# Patient Record
Sex: FEMALE | Race: WHITE | NOT HISPANIC OR LATINO | URBAN - METROPOLITAN AREA
[De-identification: names, ages, dates, MRNs, and addresses within clinical notes are randomized per-mention and may not be internally consistent; named-entity substitution may affect disease eponyms.]

---

## 2021-05-06 ENCOUNTER — OFFICE VISIT (OUTPATIENT)
Dept: OBSTETRICS AND GYNECOLOGY | Facility: CLINIC | Age: 21
End: 2021-05-06
Attending: STUDENT IN AN ORGANIZED HEALTH CARE EDUCATION/TRAINING PROGRAM
Payer: COMMERCIAL

## 2021-05-06 VITALS
BODY MASS INDEX: 23.7 KG/M2 | SYSTOLIC BLOOD PRESSURE: 112 MMHG | HEIGHT: 61 IN | WEIGHT: 125.56 LBS | DIASTOLIC BLOOD PRESSURE: 78 MMHG

## 2021-05-06 DIAGNOSIS — Z12.4 SCREENING FOR CERVICAL CANCER: Primary | ICD-10-CM

## 2021-05-06 DIAGNOSIS — Z01.419 WELL WOMAN EXAM: ICD-10-CM

## 2021-05-06 PROCEDURE — 99999 PR PBB SHADOW E&M-NEW PATIENT-LVL II: ICD-10-PCS | Mod: PBBFAC,,, | Performed by: STUDENT IN AN ORGANIZED HEALTH CARE EDUCATION/TRAINING PROGRAM

## 2021-05-06 PROCEDURE — 99385 PREV VISIT NEW AGE 18-39: CPT | Mod: S$GLB,,, | Performed by: STUDENT IN AN ORGANIZED HEALTH CARE EDUCATION/TRAINING PROGRAM

## 2021-05-06 PROCEDURE — 99999 PR PBB SHADOW E&M-NEW PATIENT-LVL II: CPT | Mod: PBBFAC,,, | Performed by: STUDENT IN AN ORGANIZED HEALTH CARE EDUCATION/TRAINING PROGRAM

## 2021-05-06 PROCEDURE — 88175 CYTOPATH C/V AUTO FLUID REDO: CPT | Performed by: STUDENT IN AN ORGANIZED HEALTH CARE EDUCATION/TRAINING PROGRAM

## 2021-05-06 PROCEDURE — 99385 PR PREVENTIVE VISIT,NEW,18-39: ICD-10-PCS | Mod: S$GLB,,, | Performed by: STUDENT IN AN ORGANIZED HEALTH CARE EDUCATION/TRAINING PROGRAM

## 2021-05-13 LAB
CLINICAL INFO: NORMAL
CYTO CVX: NORMAL
CYTOLOGIST CVX/VAG CYTO: NORMAL
CYTOLOGY CMNT CVX/VAG CYTO-IMP: NORMAL
CYTOLOGY PAP THIN PREP EXPLANATION: NORMAL
DATE OF PREVIOUS PAP: NORMAL
DATE PREVIOUS BX: NO
LMP START DATE: NORMAL
SPECIMEN SOURCE CVX/VAG CYTO: NORMAL
STAT OF ADQ CVX/VAG CYTO-IMP: NORMAL

## 2021-05-14 ENCOUNTER — TELEPHONE (OUTPATIENT)
Dept: OBSTETRICS AND GYNECOLOGY | Facility: CLINIC | Age: 21
End: 2021-05-14

## 2022-03-15 ENCOUNTER — OFFICE VISIT (OUTPATIENT)
Dept: DERMATOLOGY | Facility: CLINIC | Age: 22
End: 2022-03-15
Payer: COMMERCIAL

## 2022-03-15 DIAGNOSIS — L20.9 ATOPIC DERMATITIS, UNSPECIFIED TYPE: Primary | ICD-10-CM

## 2022-03-15 DIAGNOSIS — L70.0 ACNE VULGARIS: ICD-10-CM

## 2022-03-15 PROCEDURE — 99204 OFFICE O/P NEW MOD 45 MIN: CPT | Mod: S$GLB,,, | Performed by: STUDENT IN AN ORGANIZED HEALTH CARE EDUCATION/TRAINING PROGRAM

## 2022-03-15 PROCEDURE — 99204 PR OFFICE/OUTPT VISIT, NEW, LEVL IV, 45-59 MIN: ICD-10-PCS | Mod: S$GLB,,, | Performed by: STUDENT IN AN ORGANIZED HEALTH CARE EDUCATION/TRAINING PROGRAM

## 2022-03-15 PROCEDURE — 99999 PR PBB SHADOW E&M-EST. PATIENT-LVL II: ICD-10-PCS | Mod: PBBFAC,,, | Performed by: STUDENT IN AN ORGANIZED HEALTH CARE EDUCATION/TRAINING PROGRAM

## 2022-03-15 PROCEDURE — 99999 PR PBB SHADOW E&M-EST. PATIENT-LVL II: CPT | Mod: PBBFAC,,, | Performed by: STUDENT IN AN ORGANIZED HEALTH CARE EDUCATION/TRAINING PROGRAM

## 2022-03-15 RX ORDER — TRIFAROTENE 50 UG/G
1 CREAM TOPICAL NIGHTLY
Qty: 45 G | Refills: 5 | Status: SHIPPED | OUTPATIENT
Start: 2022-03-15 | End: 2023-11-08

## 2022-03-15 RX ORDER — CLINDAMYCIN PHOSPHATE 11.9 MG/ML
SOLUTION TOPICAL 2 TIMES DAILY
Qty: 60 ML | Refills: 5 | Status: SHIPPED | OUTPATIENT
Start: 2022-03-15 | End: 2023-11-08

## 2022-03-15 RX ORDER — PIMECROLIMUS 10 MG/G
CREAM TOPICAL
Qty: 100 G | Refills: 3 | Status: SHIPPED | OUTPATIENT
Start: 2022-03-15 | End: 2023-11-08

## 2022-03-15 RX ORDER — TRIAMCINOLONE ACETONIDE 1 MG/G
CREAM TOPICAL 2 TIMES DAILY
Qty: 80 G | Refills: 2 | Status: SHIPPED | OUTPATIENT
Start: 2022-03-15 | End: 2023-11-08

## 2022-03-15 RX ORDER — TRIFAROTENE 50 UG/G
1 CREAM TOPICAL NIGHTLY
Qty: 45 G | Refills: 5 | Status: SHIPPED | OUTPATIENT
Start: 2022-03-15 | End: 2022-03-15

## 2022-03-15 NOTE — PROGRESS NOTES
Patient Information  Name: Sallie Del Real  : 2000  MRN: [id number]     Referring Physician:  Dr. Dominguez   Primary Care Physician:   Primary Doctor No   Date of Visit: 03/15/2022      Subjective:       Sallie Del Real is a 21 y.o. female who presents for   Chief Complaint   Patient presents with    Rash     Rash on neck . Very itching and burning     Acne     Ance on face      HPI  Patient with new complaint of lesion(s)  Location: neck  Duration: years  Symptoms: itching, burning  Relieving factors/Previous treatments: topical steroids    Also with hx of eczema. Present on hands, arms. Using topical steroids.    Patient with new complaint of acne  Location: face  Duration: years  Symptoms: white/blackheads  Relieving factors/Previous treatments: clindamycin, epiduo forte,       Patient was last seen:Visit date not found     Prior notes by myself reviewed.   Clinical documentation obtained by nursing staff reviewed.    Review of Systems   Skin: Positive for itching and rash.        Objective:    Physical Exam   Constitutional: She appears well-developed and well-nourished. No distress.   Neurological: She is alert and oriented to person, place, and time. She is not disoriented.   Psychiatric: She has a normal mood and affect.   Skin:   Areas Examined (abnormalities noted in diagram):   Head / Face Inspection Performed  Neck Inspection Performed                   Diagram Legend     Erythematous scaling macule/papule c/w actinic keratosis       Vascular papule c/w angioma      Pigmented verrucoid papule/plaque c/w seborrheic keratosis      Yellow umbilicated papule c/w sebaceous hyperplasia      Irregularly shaped tan macule c/w lentigo     1-2 mm smooth white papules consistent with Milia      Movable subcutaneous cyst with punctum c/w epidermal inclusion cyst      Subcutaneous movable cyst c/w pilar cyst      Firm pink to brown papule c/w dermatofibroma      Pedunculated fleshy papule(s) c/w skin tag(s)       Evenly pigmented macule c/w junctional nevus     Mildly variegated pigmented, slightly irregular-bordered macule c/w mildly atypical nevus      Flesh colored to evenly pigmented papule c/w intradermal nevus       Pink pearly papule/plaque c/w basal cell carcinoma      Erythematous hyperkeratotic cursted plaque c/w SCC      Surgical scar with no sign of skin cancer recurrence      Open and closed comedones      Inflammatory papules and pustules      Verrucoid papule consistent consistent with wart     Erythematous eczematous patches and plaques     Dystrophic onycholytic nail with subungual debris c/w onychomycosis     Umbilicated papule    Erythematous-base heme-crusted tan verrucoid plaque consistent with inflamed seborrheic keratosis     Erythematous Silvery Scaling Plaque c/w Psoriasis     See annotation      No images are attached to the encounter or orders placed in the encounter.    [] Data reviewed  [] Independent review of test  [] Management discussed with another provider    Assessment / Plan:        Atopic dermatitis, unspecified type  -     triamcinolone acetonide 0.1% (KENALOG) 0.1 % cream; Apply topically 2 (two) times daily.  Dispense: 80 g; Refill: 2  -     pimecrolimus (ELIDEL) 1 % cream; AAA bid  Dispense: 100 g; Refill: 3    Acne vulgaris  -     clindamycin (CLEOCIN T) 1 % external solution; Apply topically 2 (two) times daily.  Dispense: 60 mL; Refill: 5  -     trifarotene (AKLIEF) 0.005 % Crea; Apply 1 application topically every evening.  Dispense: 45 g; Refill: 5             LOS NUMBER AND COMPLEXITY OF PROBLEMS    COMPLEXITY OF DATA RISK TOTAL TIME (m)   21503  70507 [] 1 self-limited or minor problem [x] Minimal to none [] No treatment recommended or patient to monitor 15-29  10-19   35780  86260 Low  [] 2 or > self limited or minor problems  [] 1 stable chronic illness  [] 1 acute, uncomplicated illness or injury Limited (2)  [] Prior external notes from each unique source  [] Review  result of each unique test  [] Order each unique test []  Low  OTC medications, minor skin biopsy 30-44  20-29   73578  65851 Moderate  [x]  1 or > chronic illness with progression, exacerbation or SE of treatment  []  2 or more stable chronic illnesses  []  1 acute illness with systemic symptoms  []  1 acute complicated injury  []  1 undiagnosed new problem with uncertain prognosis Moderate (1/3 below)  []  3 or more data items        *Now includes assessment requiring independent historian  []  Independent interpretation of a test  []  Discuss management/test with another provider Moderate  [x]  Prescription drug mgmt  []  Minor surgery with risk discussed  []  Mgmt limited by social determinates 45-59  30-39   31833  85449 High  []  1 or more chronic illness with severe exacerbation, progression or SE of treatment  []  1 acute or chronic illness/injury that poses a threat to life or bodily function Extensive (2/3 below)  []  3 or more data items        *Now includes assessment requiring independent historian.  []  Independent interpretation of a test  []  Discuss management/test with another provider High  []  Major surgery with risk discussed  []  Drug therapy requiring intensive monitoring for toxicity  []  Hospitalization  []  Decision for DNR 60-74  40-54      Follow up in about 3 months (around 6/15/2022).    Pat Barajas MD, FAAD  [facility]

## 2022-03-17 ENCOUNTER — TELEPHONE (OUTPATIENT)
Dept: PHARMACY | Facility: CLINIC | Age: 22
End: 2022-03-17
Payer: COMMERCIAL

## 2022-06-15 ENCOUNTER — OFFICE VISIT (OUTPATIENT)
Dept: FAMILY MEDICINE | Facility: CLINIC | Age: 22
End: 2022-06-15
Payer: COMMERCIAL

## 2022-06-15 VITALS
DIASTOLIC BLOOD PRESSURE: 74 MMHG | SYSTOLIC BLOOD PRESSURE: 108 MMHG | HEIGHT: 61 IN | OXYGEN SATURATION: 100 % | BODY MASS INDEX: 22.27 KG/M2 | WEIGHT: 117.94 LBS | HEART RATE: 65 BPM

## 2022-06-15 DIAGNOSIS — Z00.00 ANNUAL PHYSICAL EXAM: ICD-10-CM

## 2022-06-15 DIAGNOSIS — Z02.0 ENCOUNTER FOR SCHOOL EXAMINATION: Primary | ICD-10-CM

## 2022-06-15 PROCEDURE — 99395 PR PREVENTIVE VISIT,EST,18-39: ICD-10-PCS | Mod: S$GLB,,, | Performed by: INTERNAL MEDICINE

## 2022-06-15 PROCEDURE — 99395 PREV VISIT EST AGE 18-39: CPT | Mod: S$GLB,,, | Performed by: INTERNAL MEDICINE

## 2022-06-15 PROCEDURE — 99999 PR PBB SHADOW E&M-EST. PATIENT-LVL IV: ICD-10-PCS | Mod: PBBFAC,,, | Performed by: INTERNAL MEDICINE

## 2022-06-15 PROCEDURE — 99999 PR PBB SHADOW E&M-EST. PATIENT-LVL IV: CPT | Mod: PBBFAC,,, | Performed by: INTERNAL MEDICINE

## 2022-06-15 RX ORDER — CETIRIZINE HYDROCHLORIDE 10 MG/1
10 TABLET ORAL DAILY
COMMUNITY
End: 2023-11-08

## 2022-06-15 RX ORDER — FLUTICASONE PROPIONATE 50 MCG
1 SPRAY, SUSPENSION (ML) NASAL DAILY
COMMUNITY

## 2022-06-15 NOTE — PROGRESS NOTES
Subjective:       Patient ID: Sallie Del Real is a 22 y.o. female.    Chief Complaint: Establish Care      HPI  Sallie Del Real is a 22 y.o. female with no chronic conditions who presents today to establish care and for physical exam.    Patient overall feels fine.  She is going to start in [employer] school and needs health forms filled out.    Has no toxic habits.  Exercises 35-40 minutes daily.  Not on any specific diet.    Vaccines are up-to-date but needs titers done.    Health Maintenance:  Health Maintenance   Topic Date Due    Hepatitis C Screening  Never done    Lipid Panel  Never done    Pap Smear  05/06/2024    TETANUS VACCINE  07/22/2030    HPV Vaccines  Completed       Review of Systems   Constitutional: Negative for appetite change, chills, fatigue, fever and unexpected weight change.   HENT: Negative for nasal congestion, hearing loss, rhinorrhea and sore throat.    Eyes: Negative for redness and visual disturbance.   Respiratory: Negative for cough and shortness of breath.    Cardiovascular: Negative for chest pain, palpitations, leg swelling and claudication.   Gastrointestinal: Negative for abdominal pain, change in bowel habit, constipation, diarrhea, nausea, vomiting and change in bowel habit.   Genitourinary: Negative for bladder incontinence, difficulty urinating and dysuria.   Musculoskeletal: Negative.    Integumentary:  Negative for rash.   Neurological: Negative for dizziness, weakness, light-headedness, numbness, headaches and memory loss.   Hematological: Does not bruise/bleed easily.   Psychiatric/Behavioral: Negative for dysphoric mood and sleep disturbance. The patient is not nervous/anxious.       No past medical history on file.    Past Surgical History:   Procedure Laterality Date    NASAL TURBINATE REDUCTION  12/2019       Family History   Problem Relation Age of Onset    Colon cancer Maternal Grandfather     Breast cancer Neg Hx     Ovarian cancer Neg Hx         Social History     Socioeconomic History    Marital status: Single   Tobacco Use    Smoking status: Never Smoker    Smokeless tobacco: Never Used   Substance and Sexual Activity    Alcohol use: Yes     Comment: socially    Drug use: Never    Sexual activity: Never       Current Outpatient Medications   Medication Sig Dispense Refill    clindamycin (CLEOCIN T) 1 % external solution Apply topically 2 (two) times daily. 60 mL 5    pimecrolimus (ELIDEL) 1 % cream Apply to the affected area 2 times daily 100 g 3    triamcinolone acetonide 0.1% (KENALOG) 0.1 % cream Apply topically 2 (two) times daily. 80 g 2    trifarotene (AKLIEF) 0.005 % Crea Apply 1 application topically every evening. 45 g 5     No current facility-administered medications for this visit.       Review of patient's allergies indicates:   Allergen Reactions    Shellfish containing products      Other reaction(s): hives         Objective:       Last 3 sets of Vitals    Vitals - 1 value per visit 3/15/2022 6/15/2022 6/15/2022   SYSTOLIC - - 116   DIASTOLIC - - 84   Pulse - - 65   SPO2 - - 100   Weight (lb) - - 117.95   Weight (kg) - - 53.5   Height - - 61   BMI (Calculated) - - 22.3   VISIT REPORT - - -   Pain Score  0 0 -   Physical Exam  Constitutional:       General: She is not in acute distress.     Appearance: Normal appearance. She is normal weight.   HENT:      Head: Normocephalic.      Right Ear: Tympanic membrane, ear canal and external ear normal.      Left Ear: Tympanic membrane, ear canal and external ear normal.      Nose: Nose normal.      Mouth/Throat:      Mouth: Mucous membranes are moist.   Eyes:      General: No scleral icterus.     Extraocular Movements: Extraocular movements intact.      Conjunctiva/sclera: Conjunctivae normal.   Neck:      Vascular: No carotid bruit.      Comments: No goiter.  Cardiovascular:      Rate and Rhythm: Normal rate and regular rhythm.      Pulses: Normal pulses.      Heart sounds: Normal  heart sounds.   Pulmonary:      Effort: Pulmonary effort is normal.      Breath sounds: Normal breath sounds.   Abdominal:      General: Bowel sounds are normal. There is no distension.      Palpations: Abdomen is soft. There is no mass.      Tenderness: There is no abdominal tenderness.   Musculoskeletal:         General: No swelling. Normal range of motion.   Lymphadenopathy:      Cervical: No cervical adenopathy.   Skin:     General: Skin is warm and dry.   Neurological:      General: No focal deficit present.      Mental Status: She is alert and oriented to person, place, and time.   Psychiatric:         Mood and Affect: Mood normal.         Behavior: Behavior normal.           CBC:      CMP:      URINALYSIS:       LIPIDS:      TSH:        A1C:        Imaging:  No image results found.      Assessment:       1. Encounter for school examination    2. Annual physical exam            Plan:       Sallie was seen today for establish care.    Diagnoses and all orders for this visit:    Encounter for school examination  -     QUANTIFERON GOLD TB; Future  -     Rubella antibody, IgG; Future  -     Rubeola antibody IgG; Future  -     Mumps, IgG Screen; Future  -     Hepatitis B Surface Antibody, Qual/Quant; Future  -     Varicella zoster antibody, IgG; Future  -     CBC Auto Differential; Future  -     Comprehensive Metabolic Panel; Future  -     Lipid Panel; Future  -     TSH; Future  -     C. trachomatis/N. gonorrhoeae by AMP DNA [place]; Urine; Future    Annual physical exam  -     CBC Auto Differential; Future  -     Comprehensive Metabolic Panel; Future  -     Lipid Panel; Future  -     TSH; Future  -     C. trachomatis/N. gonorrhoeae by AMP DNA [facility]; Urine; Future  - stable physical exam      Health Maintenance Due   Topic Date Due    Hepatitis C Screening  Never done    Lipid Panel  Never done    HIV Screening  Never done        Return to clinic in 12 months or as needed.    Shaina Flaherty MD  [facility]  Disclaimer:  This note has been generated using voice-recognition software. There may be grammatical or spelling errors that have been missed during proof-reading

## 2022-06-16 ENCOUNTER — LAB VISIT (OUTPATIENT)
Dept: LAB | Facility: HOSPITAL | Age: 22
End: 2022-06-16
Attending: INTERNAL MEDICINE
Payer: COMMERCIAL

## 2022-06-16 DIAGNOSIS — Z00.00 ANNUAL PHYSICAL EXAM: ICD-10-CM

## 2022-06-16 DIAGNOSIS — Z02.0 ENCOUNTER FOR SCHOOL EXAMINATION: ICD-10-CM

## 2022-06-16 LAB
ALBUMIN SERPL BCP-MCNC: 4.2 G/DL (ref 3.5–5.2)
ALP SERPL-CCNC: 50 U/L (ref 55–135)
ALT SERPL W/O P-5'-P-CCNC: 14 U/L (ref 10–44)
ANION GAP SERPL CALC-SCNC: 12 MMOL/L (ref 8–16)
AST SERPL-CCNC: 13 U/L (ref 10–40)
BASOPHILS # BLD AUTO: 0.04 K/UL (ref 0–0.2)
BASOPHILS NFR BLD: 0.7 % (ref 0–1.9)
BILIRUB SERPL-MCNC: 0.2 MG/DL (ref 0.1–1)
BUN SERPL-MCNC: 16 MG/DL (ref 6–20)
CALCIUM SERPL-MCNC: 9.6 MG/DL (ref 8.7–10.5)
CHLORIDE SERPL-SCNC: 107 MMOL/L (ref 95–110)
CHOLEST SERPL-MCNC: 149 MG/DL (ref 120–199)
CHOLEST/HDLC SERPL: 2.4 {RATIO} (ref 2–5)
CO2 SERPL-SCNC: 22 MMOL/L (ref 23–29)
CREAT SERPL-MCNC: 0.8 MG/DL (ref 0.5–1.4)
DIFFERENTIAL METHOD: ABNORMAL
EOSINOPHIL # BLD AUTO: 0.1 K/UL (ref 0–0.5)
EOSINOPHIL NFR BLD: 2.1 % (ref 0–8)
ERYTHROCYTE [DISTWIDTH] IN BLOOD BY AUTOMATED COUNT: 12.4 % (ref 11.5–14.5)
EST. GFR  (AFRICAN AMERICAN): >60 ML/MIN/1.73 M^2
EST. GFR  (NON AFRICAN AMERICAN): >60 ML/MIN/1.73 M^2
GLUCOSE SERPL-MCNC: 89 MG/DL (ref 70–110)
HCT VFR BLD AUTO: 40.1 % (ref 37–48.5)
HDLC SERPL-MCNC: 62 MG/DL (ref 40–75)
HDLC SERPL: 41.6 % (ref 20–50)
HGB BLD-MCNC: 12.6 G/DL (ref 12–16)
IMM GRANULOCYTES # BLD AUTO: 0.02 K/UL (ref 0–0.04)
IMM GRANULOCYTES NFR BLD AUTO: 0.4 % (ref 0–0.5)
LDLC SERPL CALC-MCNC: 74.4 MG/DL (ref 63–159)
LYMPHOCYTES # BLD AUTO: 1.8 K/UL (ref 1–4.8)
LYMPHOCYTES NFR BLD: 34.1 % (ref 18–48)
MCH RBC QN AUTO: 28.8 PG (ref 27–31)
MCHC RBC AUTO-ENTMCNC: 31.4 G/DL (ref 32–36)
MCV RBC AUTO: 92 FL (ref 82–98)
MONOCYTES # BLD AUTO: 0.4 K/UL (ref 0.3–1)
MONOCYTES NFR BLD: 7.8 % (ref 4–15)
NEUTROPHILS # BLD AUTO: 2.9 K/UL (ref 1.8–7.7)
NEUTROPHILS NFR BLD: 54.9 % (ref 38–73)
NONHDLC SERPL-MCNC: 87 MG/DL
NRBC BLD-RTO: 0 /100 WBC
PLATELET # BLD AUTO: 342 K/UL (ref 150–450)
PMV BLD AUTO: 11.1 FL (ref 9.2–12.9)
POTASSIUM SERPL-SCNC: 4.5 MMOL/L (ref 3.5–5.1)
PROT SERPL-MCNC: 6.9 G/DL (ref 6–8.4)
RBC # BLD AUTO: 4.37 M/UL (ref 4–5.4)
SODIUM SERPL-SCNC: 141 MMOL/L (ref 136–145)
TRIGL SERPL-MCNC: 63 MG/DL (ref 30–150)
TSH SERPL DL<=0.005 MIU/L-ACNC: 1.03 UIU/ML (ref 0.4–4)
WBC # BLD AUTO: 5.36 K/UL (ref 3.9–12.7)

## 2022-06-16 PROCEDURE — 86765 RUBEOLA ANTIBODY: CPT | Performed by: INTERNAL MEDICINE

## 2022-06-16 PROCEDURE — 36415 COLL VENOUS BLD VENIPUNCTURE: CPT | Performed by: INTERNAL MEDICINE

## 2022-06-16 PROCEDURE — 84443 ASSAY THYROID STIM HORMONE: CPT | Performed by: INTERNAL MEDICINE

## 2022-06-16 PROCEDURE — 86787 VARICELLA-ZOSTER ANTIBODY: CPT | Performed by: INTERNAL MEDICINE

## 2022-06-16 PROCEDURE — 86735 MUMPS ANTIBODY: CPT | Performed by: INTERNAL MEDICINE

## 2022-06-16 PROCEDURE — 80053 COMPREHEN METABOLIC PANEL: CPT | Performed by: INTERNAL MEDICINE

## 2022-06-16 PROCEDURE — 86706 HEP B SURFACE ANTIBODY: CPT | Performed by: INTERNAL MEDICINE

## 2022-06-16 PROCEDURE — 86762 RUBELLA ANTIBODY: CPT | Performed by: INTERNAL MEDICINE

## 2022-06-16 PROCEDURE — 85025 COMPLETE CBC W/AUTO DIFF WBC: CPT | Performed by: INTERNAL MEDICINE

## 2022-06-16 PROCEDURE — 80061 LIPID PANEL: CPT | Performed by: INTERNAL MEDICINE

## 2022-06-17 ENCOUNTER — PATIENT MESSAGE (OUTPATIENT)
Dept: FAMILY MEDICINE | Facility: CLINIC | Age: 22
End: 2022-06-17
Payer: COMMERCIAL

## 2022-06-17 LAB
RUBV IGG SER-ACNC: 30.5 IU/ML
RUBV IGG SER-IMP: REACTIVE

## 2022-06-19 ENCOUNTER — PATIENT MESSAGE (OUTPATIENT)
Dept: FAMILY MEDICINE | Facility: CLINIC | Age: 22
End: 2022-06-19
Payer: COMMERCIAL

## 2022-06-19 DIAGNOSIS — Z92.29 HEPATITIS B NON-CONVERTER (POST-VACCINATION): Primary | ICD-10-CM

## 2022-06-20 LAB
HBV SURFACE AB SER QL IA: NEGATIVE
HBV SURFACE AB SERPL IA-ACNC: <3 MIU/ML
MUMPS IGG INTERPRETATION: POSITIVE
MUMPS IGG SCREEN: 1.64 ISR (ref 0–0.9)
RUBEOLA IGG ANTIBODY: 2.02 ISR (ref 0–0.9)
RUBEOLA INTERPRETATION: POSITIVE
VARICELLA INTERPRETATION: POSITIVE
VARICELLA ZOSTER IGG: 2.21 ISR (ref 0–0.9)

## 2022-06-22 NOTE — TELEPHONE ENCOUNTER
Spoke to pt. Scheduled Hep B titers. [facility] is [facility]

## 2022-06-23 ENCOUNTER — CLINICAL SUPPORT (OUTPATIENT)
Dept: FAMILY MEDICINE | Facility: CLINIC | Age: 22
End: 2022-06-23
Payer: COMMERCIAL

## 2022-06-23 PROCEDURE — 90471 HEPATITIS B (RECOMBINANT) ADJUVANTED, 2 DOSE: ICD-10-PCS | Mod: S$GLB,,, | Performed by: INTERNAL MEDICINE

## 2022-06-23 PROCEDURE — 90739 HEPB VACC 2/4 DOSE ADULT IM: CPT | Mod: S$GLB,,, | Performed by: INTERNAL MEDICINE

## 2022-06-23 PROCEDURE — 90471 IMMUNIZATION ADMIN: CPT | Mod: S$GLB,,, | Performed by: INTERNAL MEDICINE

## 2022-06-23 PROCEDURE — 90739 HEPATITIS B (RECOMBINANT) ADJUVANTED, 2 DOSE: ICD-10-PCS | Mod: S$GLB,,, | Performed by: INTERNAL MEDICINE

## 2022-07-21 ENCOUNTER — OFFICE VISIT (OUTPATIENT)
Dept: OBSTETRICS AND GYNECOLOGY | Facility: CLINIC | Age: 22
End: 2022-07-21
Attending: STUDENT IN AN ORGANIZED HEALTH CARE EDUCATION/TRAINING PROGRAM
Payer: COMMERCIAL

## 2022-07-21 VITALS
BODY MASS INDEX: 22.75 KG/M2 | WEIGHT: 120.5 LBS | HEIGHT: 61 IN | DIASTOLIC BLOOD PRESSURE: 68 MMHG | SYSTOLIC BLOOD PRESSURE: 110 MMHG

## 2022-07-21 DIAGNOSIS — Z01.419 WELL WOMAN EXAM: Primary | ICD-10-CM

## 2022-07-21 DIAGNOSIS — N94.6 DYSMENORRHEA: ICD-10-CM

## 2022-07-21 PROCEDURE — 99999 PR PBB SHADOW E&M-EST. PATIENT-LVL III: ICD-10-PCS | Mod: PBBFAC,,, | Performed by: STUDENT IN AN ORGANIZED HEALTH CARE EDUCATION/TRAINING PROGRAM

## 2022-07-21 PROCEDURE — 99395 PREV VISIT EST AGE 18-39: CPT | Mod: S$GLB,,, | Performed by: STUDENT IN AN ORGANIZED HEALTH CARE EDUCATION/TRAINING PROGRAM

## 2022-07-21 PROCEDURE — 99395 PR PREVENTIVE VISIT,EST,18-39: ICD-10-PCS | Mod: S$GLB,,, | Performed by: STUDENT IN AN ORGANIZED HEALTH CARE EDUCATION/TRAINING PROGRAM

## 2022-07-21 PROCEDURE — 99999 PR PBB SHADOW E&M-EST. PATIENT-LVL III: CPT | Mod: PBBFAC,,, | Performed by: STUDENT IN AN ORGANIZED HEALTH CARE EDUCATION/TRAINING PROGRAM

## 2022-07-21 NOTE — PROGRESS NOTES
Chief Complaint: Well Woman Exam     HPI:      Sallie Del Real is a 22 y.o.  who presents today for well woman exam.  LMP: Patient's last menstrual period was 2022.  No issues, problems, or complaints. Does have some pelvic pain, intermittent. Specifically, patient denies abnormal vaginal bleeding, discharge, pelvic pain, urinary problems, or changes in appetite. Ms. Del Real is not currently sexually active. She is currently using no method for contraception. She declines STD screening today.    Previous Pap:  NEM    GYN Hx:  Menarche 12.  Reports cycles occur every 28 days with menses lasting 6 days.  Denies history of abnormal pap smears or STIs.  Gardasil:  Received.       OB History        0    Para   0    Term   0       0    AB   0    Living   0       SAB   0    IAB   0    Ectopic   0    Multiple   0    Live Births   0               No past medical history on file.  Past Surgical History:   Procedure Laterality Date    NASAL TURBINATE REDUCTION  2019,    TONSILLECTOMY      WISDOM TOOTH EXTRACTION       Social History     Socioeconomic History    Marital status: Single   Tobacco Use    Smoking status: Never Smoker    Smokeless tobacco: Never Used   Substance and Sexual Activity    Alcohol use: Yes     Alcohol/week: 2.0 standard drinks     Types: 2 Standard drinks or equivalent per week     Comment: socially    Drug use: Never    Sexual activity: Never     Family History   Problem Relation Age of Onset    Nephrolithiasis Mother     Hyperlipidemia Father     Nephrolithiasis Sister     Ovarian cysts Sister     No Known Problems Brother     Stroke Maternal Grandmother     Hypertension Maternal Grandfather     Cancer Maternal Grandfather         Colon    Diabetes Maternal Grandfather     Colon cancer Maternal Grandfather     Heart disease Maternal Grandfather     No Known Problems Paternal Grandmother     Breast cancer Neg Hx     Ovarian  "cancer Neg Hx        Current Outpatient Medications:     cetirizine (ZYRTEC) 10 MG tablet, Take 10 mg by mouth once daily., Disp: , Rfl:     clindamycin (CLEOCIN T) 1 % external solution, Apply topically 2 (two) times daily., Disp: 60 mL, Rfl: 5    fluticasone propionate (FLONASE) 50 mcg/actuation nasal spray, 1 spray by Each Nostril route once daily., Disp: , Rfl:     pimecrolimus (ELIDEL) 1 % cream, Apply to the affected area 2 times daily, Disp: 100 g, Rfl: 3    triamcinolone acetonide 0.1% (KENALOG) 0.1 % cream, Apply topically 2 (two) times daily., Disp: 80 g, Rfl: 2    trifarotene (AKLIEF) 0.005 % Crea, Apply 1 application topically every evening., Disp: 45 g, Rfl: 5    etonogestreL-ethinyl estradioL (NUVARING) 0.12-0.015 mg/24 hr vaginal ring, Place 1 each vaginally every 28 days., Disp: 1 each, Rfl: 11    ROS:     GENERAL: Denies unintentional weight gain or weight loss. Feeling well overall.   SKIN: Denies rash or lesions.   HEENT: Denies headaches, or vision changes.   CARDIOVASCULAR: Denies palpitations or chest pain.   RESPIRATORY: Denies shortness of breath or dyspnea on exertion.  BREASTS: Denies pain, lumps, or nipple discharge.   ABDOMEN: Denies abdominal pain, constipation, diarrhea, nausea, vomiting, change in appetite.  URINARY: Denies frequency, dysuria, hematuria.  NEUROLOGIC: Denies syncope or weakness.   PSYCHIATRIC: Denies depression, anxiety or mood swings.    Physical Exam:      PHYSICAL EXAM:  /68 (BP Location: Left arm, Patient Position: Sitting, BP Method: Medium (Manual))   Ht 5' 1" (1.549 m)   Wt 54.6 kg (120 lb 7.7 oz)   LMP 06/29/2022   BMI 22.76 kg/m²   Body mass index is 22.76 kg/m².     APPEARANCE: Well nourished, well developed, in no acute distress.  PSYCH: Appropriate mood and affect.  SKIN: No acne or hirsutism.  NECK: Neck symmetric without masses or thyromegaly.  NODES: No inguinal, axillary, or supraclavicular lymph node enlargement.  CHEST: Normal " respiratory effort.    CARDIOVASCULAR:  Regular rate and rhythm.  BREASTS: Symmetrical, no skin changes or visible lesions.  No palpable masses or nipple discharge bilaterally.  ABDOMEN: Soft.  No tenderness or masses.    PELVIC: Normal external genitalia without lesions.  Normal hair distribution.  Adequate perineal body, normal urethral meatus.  Vagina moist and well rugated without lesions or discharge.  Cervix pink, without lesions, discharge or tenderness.  No significant cystocele or rectocele.  Bimanual exam shows uterus to be normal size, regular, mobile and nontender.  Adnexa without masses or tenderness.    EXTREMITIES: No edema.  No tenderness to palpation.  upt neg    Assessment/Plan:     22 y.o.     Well woman exam    Dysmenorrhea  -     US Pelvis Comp with Transvag NON-OB (xpd; Future; Expected date: 2022    Other orders  -     etonogestreL-ethinyl estradioL (NUVARING) 0.12-0.015 mg/24 hr vaginal ring; Place 1 each vaginally every 28 days.  Dispense: 1 each; Refill: 11          Counselin.  Annual exam performed today without difficulty.  Patient was counseled today on current ASCCP pap guidelines, the recommendation for yearly pelvic exams, healthy diet and exercise routines, breast self awareness.  Pap smear collected.  All questions answered.    2.  Contraception:  After a discussion of the R/B/A of fertility awareness, barrier contraception, hormonal pills, injections, patches, rings, hormonal and non-hormonal IUDs, and the subdermal implant, all of  questions were answered, and she has opted for Nuva Ring.  Today's discussion included:  * When to initiate Nuva Ring.  * The need for regular compliance to ensure adequate contraceptive effect.  * What to do in event of a Ring falling out or delayed re insertion.  * Potential minor side effects such as breakthrough spotting, nausea, breast tenderness, weight changes, acne, headaches, etc.   * Potential though less likely major side  effects such as MI, stroke, and deep vein thrombosis. She has been asked to report any signs of such serious problems immediately.    * The need for a back-up form of contraception such as condoms during any cycle in which antibiotics are prescribed, and during the first cycle.   * The need for barrier contraception to prevent exposure to sexually transmitted diseases. Ms. Hampton was clearly counseled that Nuva Ring cannot protect her against diseases such as HIV, herpes, and others.   All questions were answered, she voiced understanding, and she wishes to take the medication as prescribed.  3.  STD testing:  Declines.  4.  Follow up with PCP for other health maintenance.  5.  Follow up in about 4 weeks (around 8/18/2022).

## 2022-07-22 ENCOUNTER — TELEPHONE (OUTPATIENT)
Dept: OBSTETRICS AND GYNECOLOGY | Facility: CLINIC | Age: 22
End: 2022-07-22

## 2022-07-22 ENCOUNTER — CLINICAL SUPPORT (OUTPATIENT)
Dept: FAMILY MEDICINE | Facility: CLINIC | Age: 22
End: 2022-07-22
Payer: COMMERCIAL

## 2022-07-22 PROCEDURE — 90471 HEPATITIS B VACCINE ADULT IM: ICD-10-PCS | Mod: S$GLB,,, | Performed by: INTERNAL MEDICINE

## 2022-07-22 PROCEDURE — 90746 HEPB VACCINE 3 DOSE ADULT IM: CPT | Mod: S$GLB,,, | Performed by: INTERNAL MEDICINE

## 2022-07-22 PROCEDURE — 90471 IMMUNIZATION ADMIN: CPT | Mod: S$GLB,,, | Performed by: INTERNAL MEDICINE

## 2022-07-22 PROCEDURE — 90746 HEPATITIS B VACCINE ADULT IM: ICD-10-PCS | Mod: S$GLB,,, | Performed by: INTERNAL MEDICINE

## 2022-07-22 RX ORDER — ETONOGESTREL AND ETHINYL ESTRADIOL VAGINAL RING .015; .12 MG/D; MG/D
1 RING VAGINAL
Qty: 1 EACH | Refills: 11 | Status: SHIPPED | OUTPATIENT
Start: 2022-07-22 | End: 2023-08-21 | Stop reason: SDUPTHER

## 2022-10-21 ENCOUNTER — HOSPITAL ENCOUNTER (OUTPATIENT)
Dept: RADIOLOGY | Facility: OTHER | Age: 22
Discharge: HOME | End: 2022-10-21
Attending: STUDENT IN AN ORGANIZED HEALTH CARE EDUCATION/TRAINING PROGRAM
Payer: COMMERCIAL

## 2022-10-21 DIAGNOSIS — N94.6 DYSMENORRHEA: ICD-10-CM

## 2022-10-21 PROCEDURE — 76830 TRANSVAGINAL US NON-OB: CPT | Mod: TC

## 2022-10-21 PROCEDURE — 76856 US PELVIS COMP WITH TRANSVAG NON-OB (XPD): ICD-10-PCS | Mod: 26,,, | Performed by: RADIOLOGY

## 2022-10-21 PROCEDURE — 76830 TRANSVAGINAL US NON-OB: CPT | Mod: 26,,, | Performed by: RADIOLOGY

## 2022-10-21 PROCEDURE — 76830 US PELVIS COMP WITH TRANSVAG NON-OB (XPD): ICD-10-PCS | Mod: 26,,, | Performed by: RADIOLOGY

## 2022-10-21 PROCEDURE — 76856 US EXAM PELVIC COMPLETE: CPT | Mod: 26,,, | Performed by: RADIOLOGY

## 2022-10-23 ENCOUNTER — PATIENT MESSAGE (OUTPATIENT)
Dept: OBSTETRICS AND GYNECOLOGY | Facility: CLINIC | Age: 22
End: 2022-10-23
Payer: COMMERCIAL

## 2022-10-27 ENCOUNTER — TELEPHONE (OUTPATIENT)
Dept: OBSTETRICS AND GYNECOLOGY | Facility: CLINIC | Age: 22
End: 2022-10-27

## 2022-10-27 ENCOUNTER — OFFICE VISIT (OUTPATIENT)
Dept: OBSTETRICS AND GYNECOLOGY | Facility: CLINIC | Age: 22
End: 2022-10-27
Payer: COMMERCIAL

## 2022-10-27 VITALS
SYSTOLIC BLOOD PRESSURE: 100 MMHG | WEIGHT: 119.69 LBS | BODY MASS INDEX: 22.6 KG/M2 | DIASTOLIC BLOOD PRESSURE: 70 MMHG | HEIGHT: 61 IN

## 2022-10-27 DIAGNOSIS — N63.11 MASS OF UPPER OUTER QUADRANT OF RIGHT BREAST: Primary | ICD-10-CM

## 2022-10-27 PROCEDURE — 99999 PR PBB SHADOW E&M-EST. PATIENT-LVL III: CPT | Mod: PBBFAC,,, | Performed by: NURSE PRACTITIONER

## 2022-10-27 PROCEDURE — 99999 PR PBB SHADOW E&M-EST. PATIENT-LVL III: ICD-10-PCS | Mod: PBBFAC,,, | Performed by: NURSE PRACTITIONER

## 2022-10-27 PROCEDURE — 99213 OFFICE O/P EST LOW 20 MIN: CPT | Mod: S$GLB,,, | Performed by: NURSE PRACTITIONER

## 2022-10-27 PROCEDURE — 99213 PR OFFICE/OUTPT VISIT, EST, LEVL III, 20-29 MIN: ICD-10-PCS | Mod: S$GLB,,, | Performed by: NURSE PRACTITIONER

## 2022-10-27 NOTE — TELEPHONE ENCOUNTER
Jason Lomeli,   I went ahead and got patient scheduled for breast US on Wed 11/14 at 2 pm here at [facility].

## 2022-10-27 NOTE — PROGRESS NOTES
CC: Breast Mass    Sallie Del Real  complains of right breast lump that she noticed in the past week. It is located right upper, outer quadrant.  She complains of pain.  She denies skin changes. She denies nipple discharge.  She is not breastfeeding or pumping.      ROS:  GENERAL: No chills, fatigability or weight loss.  NEUROLOGICAL: No headaches. No vision changes.  CARDIOVASCULAR: No chest pain. No shortness of breath. No leg cramps.  ABDOMEN: No abdominal pain. Denies nausea. Denies vomiting. No diarrhea. No constipation  BREAST: See HPI  URINARY: No incontinence, no nocturia, no frequency and no dysuria.  VULVAR: No pain, no lesions and no itching.  VAGINA: No relaxation, no itching, no discharge, no abnormal bleeding and no lesions.      Vitals:    10/27/22 0923   BP: 100/70     GENERAL: healthy, alert, no distress  Neck: neck supply, no thryomegaly  LYMPH: No epitrochlear, supraclavicular, or axillary lymphadenopathy  BREAST: negative findings: normal contour with no evidence of flattening or dimpling, skin normal, nipples everted without rashes or discharge, no palpable axillary lymphadenopathy and positive findings: 1.5 cm, smooth, round, soft, mobile, and tender nodule located on the right upper outer quadrant and assymetry to size of breasts R>L   ABDOMEN: no masses, hepatosplenomegaly, hernias    Sallie was seen today for lump.    Diagnoses and all orders for this visit:    Mass of upper outer quadrant of right breast  -     US Breast Right Limited; Future      Soft, mobile mass at right breast, imaging ordered. Recommended refraining from caffeine.       SHILPI YoungC

## 2022-11-14 ENCOUNTER — HOSPITAL ENCOUNTER (OUTPATIENT)
Dept: RADIOLOGY | Facility: OTHER | Age: 22
Discharge: HOME | End: 2022-11-14
Attending: NURSE PRACTITIONER
Payer: COMMERCIAL

## 2022-11-14 DIAGNOSIS — N63.11 MASS OF UPPER OUTER QUADRANT OF RIGHT BREAST: ICD-10-CM

## 2022-11-14 PROCEDURE — 76642 US BREAST RIGHT LIMITED: ICD-10-PCS | Mod: 26,RT,, | Performed by: RADIOLOGY

## 2022-11-14 PROCEDURE — 76642 ULTRASOUND BREAST LIMITED: CPT | Mod: TC,RT

## 2022-11-14 PROCEDURE — 76642 ULTRASOUND BREAST LIMITED: CPT | Mod: 26,RT,, | Performed by: RADIOLOGY

## 2022-11-15 NOTE — PROGRESS NOTES
Just wanted to let you know that I got your mammogram results back and the radiologist reading is perfectly normal. Let me know if you have any questions or concerns  Hope you have a great day!  Dr Nicole

## 2022-11-17 ENCOUNTER — PATIENT MESSAGE (OUTPATIENT)
Dept: FAMILY MEDICINE | Facility: CLINIC | Age: 22
End: 2022-11-17
Payer: COMMERCIAL

## 2022-11-23 ENCOUNTER — CLINICAL SUPPORT (OUTPATIENT)
Dept: FAMILY MEDICINE | Facility: CLINIC | Age: 22
End: 2022-11-23
Payer: COMMERCIAL

## 2022-11-23 DIAGNOSIS — Z00.00 HEALTH CARE MAINTENANCE: ICD-10-CM

## 2022-11-23 DIAGNOSIS — Z23 NEEDS FLU SHOT: Primary | ICD-10-CM

## 2022-11-23 PROCEDURE — 90746 HEPB VACCINE 3 DOSE ADULT IM: CPT | Mod: S$GLB,,, | Performed by: INTERNAL MEDICINE

## 2022-11-23 PROCEDURE — 90746 HEPATITIS B VACCINE ADULT IM: ICD-10-PCS | Mod: S$GLB,,, | Performed by: INTERNAL MEDICINE

## 2022-11-23 PROCEDURE — 90471 IMMUNIZATION ADMIN: CPT | Mod: S$GLB,,, | Performed by: INTERNAL MEDICINE

## 2022-11-23 PROCEDURE — 90686 IIV4 VACC NO PRSV 0.5 ML IM: CPT | Mod: S$GLB,,, | Performed by: INTERNAL MEDICINE

## 2022-11-23 PROCEDURE — 90472 IMMUNIZATION ADMIN EACH ADD: CPT | Mod: S$GLB,,, | Performed by: INTERNAL MEDICINE

## 2022-11-23 PROCEDURE — 90471 FLU VACCINE (QUAD) GREATER THAN OR EQUAL TO 3YO PRESERVATIVE FREE IM: ICD-10-PCS | Mod: S$GLB,,, | Performed by: INTERNAL MEDICINE

## 2022-11-23 PROCEDURE — 90472 HEPATITIS B VACCINE ADULT IM: ICD-10-PCS | Mod: S$GLB,,, | Performed by: INTERNAL MEDICINE

## 2022-11-23 PROCEDURE — 90686 FLU VACCINE (QUAD) GREATER THAN OR EQUAL TO 3YO PRESERVATIVE FREE IM: ICD-10-PCS | Mod: S$GLB,,, | Performed by: INTERNAL MEDICINE

## 2023-02-15 ENCOUNTER — PATIENT MESSAGE (OUTPATIENT)
Dept: FAMILY MEDICINE | Facility: CLINIC | Age: 23
End: 2023-02-15
Payer: COMMERCIAL

## 2023-05-29 ENCOUNTER — OFFICE VISIT (OUTPATIENT)
Dept: PSYCHIATRY | Facility: CLINIC | Age: 23
End: 2023-05-29
Payer: COMMERCIAL

## 2023-05-29 VITALS
SYSTOLIC BLOOD PRESSURE: 134 MMHG | WEIGHT: 130.38 LBS | HEART RATE: 83 BPM | DIASTOLIC BLOOD PRESSURE: 65 MMHG | BODY MASS INDEX: 24.64 KG/M2

## 2023-05-29 DIAGNOSIS — F41.1 GAD (GENERALIZED ANXIETY DISORDER): ICD-10-CM

## 2023-05-29 PROCEDURE — 99999 PR PBB SHADOW E&M-EST. PATIENT-LVL II: ICD-10-PCS | Mod: PBBFAC,,, | Performed by: STUDENT IN AN ORGANIZED HEALTH CARE EDUCATION/TRAINING PROGRAM

## 2023-05-29 PROCEDURE — 99204 OFFICE O/P NEW MOD 45 MIN: CPT | Mod: S$GLB,,, | Performed by: STUDENT IN AN ORGANIZED HEALTH CARE EDUCATION/TRAINING PROGRAM

## 2023-05-29 PROCEDURE — 99204 PR OFFICE/OUTPT VISIT, NEW, LEVL IV, 45-59 MIN: ICD-10-PCS | Mod: S$GLB,,, | Performed by: STUDENT IN AN ORGANIZED HEALTH CARE EDUCATION/TRAINING PROGRAM

## 2023-05-29 PROCEDURE — 99999 PR PBB SHADOW E&M-EST. PATIENT-LVL II: CPT | Mod: PBBFAC,,, | Performed by: STUDENT IN AN ORGANIZED HEALTH CARE EDUCATION/TRAINING PROGRAM

## 2023-05-29 RX ORDER — SERTRALINE HYDROCHLORIDE 50 MG/1
50 TABLET, FILM COATED ORAL DAILY
Qty: 30 TABLET | Refills: 1 | Status: SHIPPED | OUTPATIENT
Start: 2023-05-29 | End: 2023-08-04 | Stop reason: SDUPTHER

## 2023-05-29 RX ORDER — SERTRALINE HYDROCHLORIDE 25 MG/1
25 TABLET, FILM COATED ORAL DAILY
Qty: 7 TABLET | Refills: 0 | Status: SHIPPED | OUTPATIENT
Start: 2023-05-29 | End: 2023-06-05

## 2023-05-29 NOTE — PROGRESS NOTES
"Outpatient Psychiatry Initial Visit (MD/NP)    5/29/2023    Sallie Del Real, a 23 y.o. female, presenting for initial evaluation visit. Met with patient.    Reason for Encounter: self-referral. Patient complains of anxiety.    History of Present Illness:     Pt presenting today for initial psychiatric evaluation for anxiety. Stated that she's likely had "anxiety (her) whole life" but didn't fully recognize it until a few years ago. Initially started experiencing social anxiety, but recently, she's been having anxiety even outside of social settings, and in some cases has been putting off work because she's so anxious. Recently started worrying more "constantly about anything and everything." Finds this worry difficult to control. Has been having frequent intrusive thoughts, worrying that her mom might die in a car accident, etc. Finds it hard to relax. Has been having issues with concentration as well, although she's never really had focus issues in the past; now is frequently easily distracted and it can "take forever to send an email or text." Stated that she did well in school growing up; would get straight A's, and would get really anxious if she didn't.   Denied depressed mood, stating that occasionally her mood is affected negatively when she feels very anxious, but otherwise denied depressive sx. 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 Appetite stable.   Endorsed "obsessive cleaning" when she was younger, but this hasn't been an issue recently. Did endorse "chewing on one side, having to tap something," etc.   Denied hx lena/hypomania. Denied SI/HI/AVH.   Tried Prozac in the past, not sure if it was helpful. Has also followed up with a therapist in the past. Interested in trying Zoloft; her sister is now on it and has been doing well.   Discussed starting Zoloft for anxiety. Pt agreeable to plan; denied any " other questions/concerns.         History:     Allergies:  Shellfish containing products    Past Medical/Surgical History:  No past medical history on file.  Past Surgical History:   Procedure Laterality Date    NASAL TURBINATE REDUCTION  12/2019 2010,2019    TONSILLECTOMY      WISDOM TOOTH EXTRACTION         Past Psychiatric History:  Previous Medication Trials:  Prozac    Previous Psychiatric Hospitalizations: no   Previous Suicide Attempts: no   History of Violence: no  Outpatient Psychiatrist: no  Outpatient Therapist: no  Family History: younger sister: depression; maternal aunt: depression, anxiety (?); dad: maybe OCD; paternal uncle: maybe OCD      Social History:  Childhood was good overall; dad was strict.   Marital Status: single  Children: 0   Employment Status/Info:  unemployed (recently graduated)  Education:  just graduated [employer] med school  Special Ed: no  Housing Status: lives w/ family (mom, dad,younger brother); plans on moving out in July  History of phys/sexual abuse: no  Access to gun: yes, dad has guns, but they're locked/stored, and pt doesn't know their location    Substance Abuse History:  Recreational Drugs:  denied  Use of Alcohol: occasional, social use  Rehab History: no   Tobacco Use: no  Use of OTC: no    Legal History:  Past Charges/Incarcerations: no   Pending charges: no     Psychosocial Stressors: see HPI    Review Of Systems:     Pertinent items are noted in HPI.    Current Evaluation:     Nutritional Screening: Considering the patient's height and weight, medications, medical history and preferences, should a referral be made to the dietitian? no    Constitutional  Vitals:  Most recent vital signs, dated less than 90 days prior to this appointment, were reviewed.    Vitals:    05/29/23 1346   BP: 134/65   Pulse: 83   Weight: 59.1 kg (130 lb 6.4 oz)        General:  unremarkable, age appropriate, casually dressed     Musculoskeletal  Muscle Strength/Tone:  not examined   Gait &  Station:  non-ataxic     Psychiatric  Speech:  no latency; no press   Mood & Affect:  anxious  congruent and appropriate   Thought Process:  normal and logical   Associations:  intact   Thought Content:  no suicidality, no homicidality, delusions, or paranoia   Insight:  intact, has awareness of illness   Judgement: behavior is adequate to circumstances   Orientation:  grossly intact   Memory: intact for content of interview   Language: grossly intact   Attention Span & Concentration:  able to focus   Fund of Knowledge:  intact and appropriate to age and level of education       Relevant Elements of Neurological Exam: normal gait    Functioning in Relationships:  Spouse/partner: N/A  Peers: good  Employers: N/A    Laboratory Data  No visits with results within 1 Month(s) from this visit.   Latest known visit with results is:   Lab Visit on 06/16/2022   Component Date Value Ref Range Status    NIL 06/16/2022 [id number]  IU/mL Final    TB1 - Nil 06/16/2022 0.000  IU/mL Final    TB2 - Nil 06/16/2022 0.028  IU/mL Final    Mitogen - Nil 06/16/2022 10.000  IU/mL Final    TB Gold Plus 06/16/2022 Negative  Negative Final         Medications  Outpatient Encounter Medications as of 5/29/2023   Medication Sig Dispense Refill    cetirizine (ZYRTEC) 10 MG tablet Take 10 mg by mouth once daily.      clindamycin (CLEOCIN T) 1 % external solution Apply topically 2 (two) times daily. 60 mL 5    etonogestreL-ethinyl estradioL (NUVARING) 0.12-0.015 mg/24 hr vaginal ring Place 1 each vaginally every 28 days. 1 each 11    fluticasone propionate (FLONASE) 50 mcg/actuation nasal spray 1 spray by Each Nostril route once daily.      pimecrolimus (ELIDEL) 1 % cream Apply to the affected area 2 times daily 100 g 3    triamcinolone acetonide 0.1% (KENALOG) 0.1 % cream Apply topically 2 (two) times daily. 80 g 2    trifarotene (AKLIEF) 0.005 % Crea Apply 1 application topically every evening. 45 g 5     No facility-administered encounter  medications on file as of 5/29/2023.         Assessment - Diagnosis - Goals:     Sallie Del Real, a 23 y.o. female, presenting for initial evaluation visit.     Impression:       ICD-10-CM ICD-9-CM   1. DERREK (generalized anxiety disorder)  F41.1 300.02       Treatment Plan/Recommendations:   Start Zoloft 25 mg daily x 7 days, then increase to 50 mg daily, for anxiety    Discussed with patient informed consent including diagnosis, risks and benefits of proposed treatment above vs. alternative treatments vs. no treatment, as well as serious and common side effects of these treatments, and the inherent unpredictability of individual responses to these treatments. The patient expresses understanding of the above and displays the capacity to agree with this current plan. Patient also agrees that, currently, the benefits outweigh the risks and would like to pursue treatment at this time, and had no other questions.    Instructions:  Take all medications as prescribed.    Abstain from recreational drugs and alcohol.  Present to ED or call 911 for SI/HI plan or intent, psychosis, or medical emergency.    Return to Clinic:  1 month or sooner prn    Discussed resident transition on June 30th; patient voiced understanding to immediately schedule follow up with me before June 30th or with new resident after June 30th to ensure consistent medication refills and a smooth transition.     Case seen and discussed with supervising staff, Dr. Johana Sena MD  [facility], PGY-3

## 2023-07-11 ENCOUNTER — OFFICE VISIT (OUTPATIENT)
Dept: INTERNAL MEDICINE | Facility: CLINIC | Age: 23
End: 2023-07-11
Payer: COMMERCIAL

## 2023-07-11 ENCOUNTER — LAB VISIT (OUTPATIENT)
Dept: LAB | Facility: HOSPITAL | Age: 23
End: 2023-07-11
Payer: COMMERCIAL

## 2023-07-11 VITALS
OXYGEN SATURATION: 99 % | HEIGHT: 61 IN | HEART RATE: 71 BPM | DIASTOLIC BLOOD PRESSURE: 75 MMHG | SYSTOLIC BLOOD PRESSURE: 112 MMHG | BODY MASS INDEX: 24.6 KG/M2 | WEIGHT: 130.31 LBS

## 2023-07-11 DIAGNOSIS — Z11.1 PPD SCREENING TEST: ICD-10-CM

## 2023-07-11 DIAGNOSIS — Z11.59 ENCOUNTER FOR HEPATITIS C SCREENING TEST FOR LOW RISK PATIENT: ICD-10-CM

## 2023-07-11 DIAGNOSIS — Z11.4 ENCOUNTER FOR SCREENING FOR HIV: ICD-10-CM

## 2023-07-11 DIAGNOSIS — Z01.89 ROUTINE LAB DRAW: ICD-10-CM

## 2023-07-11 DIAGNOSIS — Z13.220 SCREENING CHOLESTEROL LEVEL: ICD-10-CM

## 2023-07-11 DIAGNOSIS — Z11.3 SCREEN FOR STD (SEXUALLY TRANSMITTED DISEASE): ICD-10-CM

## 2023-07-11 DIAGNOSIS — Z00.00 ENCOUNTER FOR HEALTH MAINTENANCE EXAMINATION: Primary | ICD-10-CM

## 2023-07-11 PROCEDURE — 99999 PR PBB SHADOW E&M-EST. PATIENT-LVL IV: CPT | Mod: PBBFAC,,, | Performed by: NURSE PRACTITIONER

## 2023-07-11 PROCEDURE — 86480 TB TEST CELL IMMUN MEASURE: CPT | Performed by: NURSE PRACTITIONER

## 2023-07-11 PROCEDURE — 99395 PR PREVENTIVE VISIT,EST,18-39: ICD-10-PCS | Mod: S$GLB,,, | Performed by: NURSE PRACTITIONER

## 2023-07-11 PROCEDURE — 99999 PR PBB SHADOW E&M-EST. PATIENT-LVL IV: ICD-10-PCS | Mod: PBBFAC,,, | Performed by: NURSE PRACTITIONER

## 2023-07-11 PROCEDURE — 99395 PREV VISIT EST AGE 18-39: CPT | Mod: S$GLB,,, | Performed by: NURSE PRACTITIONER

## 2023-07-11 NOTE — PATIENT INSTRUCTIONS
Check annual labs today, will message with results, if stable repeat in 1 yr with annual    Quantiferon Gold Tb screening

## 2023-07-11 NOTE — PROGRESS NOTES
"Subjective     Patient ID: Sallie Del Real is a 23 y.o. female.    Chief Complaint: Annual Exam    Pt of Dr. Flaherty, here for, "TB test, annual blood work". 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 Also wants routine annual labs to include Thyroid.    She is fasting, no complaints.    Review of Systems   Constitutional:  Negative for activity change, appetite change and unexpected weight change.   HENT:  Negative for dental problem and hearing loss.    Eyes:  Negative for visual disturbance.   Respiratory:  Negative for apnea, cough, chest tightness and shortness of breath.    Cardiovascular:  Negative for chest pain, palpitations and leg swelling.   Gastrointestinal:  Negative for abdominal distention, abdominal pain, anal bleeding, blood in stool, constipation, diarrhea, nausea, rectal pain and vomiting.   Endocrine: Negative for cold intolerance, heat intolerance, polydipsia, polyphagia and polyuria.   Genitourinary:  Negative for difficulty urinating, hematuria, menstrual problem, pelvic pain and vaginal pain.   Musculoskeletal:  Negative for arthralgias.   Integumentary:  Negative for color change.   Allergic/Immunologic: Negative for environmental allergies, food allergies and immunocompromised state.   Neurological:  Negative for dizziness, speech difficulty, weakness, light-headedness, numbness and headaches.   Hematological:  Negative for adenopathy. Does not bruise/bleed easily.   Psychiatric/Behavioral:  Negative for agitation, behavioral problems, sleep disturbance and suicidal ideas.      Review of patient's allergies indicates:   Allergen Reactions    Shellfish containing products      Other reaction(s): hives       Current Outpatient Medications:     cetirizine (ZYRTEC) 10 MG tablet, Take 10 mg by mouth once daily., Disp: , Rfl:     clindamycin (CLEOCIN T) 1 % external solution, Apply topically 2 (two) times daily., Disp: 60 mL, Rfl: 5    etonogestreL-ethinyl estradioL (NUVARING) " "0.12-0.015 mg/24 hr vaginal ring, Place 1 each vaginally every 28 days., Disp: 1 each, Rfl: 11    fluticasone propionate (FLONASE) 50 mcg/actuation nasal spray, 1 spray by Each Nostril route once daily., Disp: , Rfl:     pimecrolimus (ELIDEL) 1 % cream, Apply to the affected area 2 times daily, Disp: 100 g, Rfl: 3    sertraline (ZOLOFT) 50 MG tablet, Take 1 tablet (50 mg total) by mouth once daily. (After completing 7 days of sertraline 25 mg daily), Disp: 30 tablet, Rfl: 1    triamcinolone acetonide 0.1% (KENALOG) 0.1 % cream, Apply topically 2 (two) times daily., Disp: 80 g, Rfl: 2    trifarotene (AKLIEF) 0.005 % Crea, Apply 1 application topically every evening., Disp: 45 g, Rfl: 5    There is no problem list on file for this patient.    History reviewed. No pertinent past medical history.    Past Surgical History:   Procedure Laterality Date    NASAL TURBINATE REDUCTION  12/2019 2010,2019    TONSILLECTOMY      WISDOM TOOTH EXTRACTION       Social History     Socioeconomic History    Marital status: Single   Tobacco Use    Smoking status: Never    Smokeless tobacco: Never   Substance and Sexual Activity    Alcohol use: Yes     Alcohol/week: 2.0 standard drinks     Types: 2 Standard drinks or equivalent per week     Comment: socially    Drug use: Never    Sexual activity: Never     Family History   Problem Relation Age of Onset    Nephrolithiasis Mother     Hyperlipidemia Father     Nephrolithiasis Sister     Ovarian cysts Sister     No Known Problems Brother     Stroke Maternal Grandmother     Hypertension Maternal Grandfather     Cancer Maternal Grandfather         Colon    Diabetes Maternal Grandfather     Colon cancer Maternal Grandfather     Heart disease Maternal Grandfather     No Known Problems Paternal Grandmother     Breast cancer Neg Hx     Ovarian cancer Neg Hx           Objective   Vitals:    07/11/23 0928   BP: 112/75   Pulse: 71   SpO2: 99%   Weight: 59.1 kg (130 lb 4.7 oz)   Height: 5' 1" (1.549 " m)   PainSc: 0-No pain     Body mass index is 24.62 kg/m².    Physical Exam  Vitals and nursing note reviewed.   Constitutional:       Appearance: Normal appearance. She is well-developed and normal weight.   HENT:      Head: Normocephalic.      Right Ear: Hearing, tympanic membrane, ear canal and external ear normal.      Left Ear: Hearing, tympanic membrane, ear canal and external ear normal.      Nose: Nose normal.      Mouth/Throat:      Mouth: Mucous membranes are moist.      Pharynx: Oropharynx is clear.   Eyes:      General: Lids are normal. Lids are everted, no foreign bodies appreciated.      Extraocular Movements: Extraocular movements intact.      Conjunctiva/sclera: Conjunctivae normal.      Pupils: Pupils are equal, round, and reactive to light.   Neck:      Vascular: No carotid bruit or JVD.      Trachea: Trachea normal.   Cardiovascular:      Rate and Rhythm: Normal rate and regular rhythm.      Pulses: Normal pulses.      Heart sounds: Normal heart sounds, S1 normal and S2 normal.   Pulmonary:      Effort: Pulmonary effort is normal.      Breath sounds: Normal breath sounds.   Abdominal:      General: Bowel sounds are normal.      Palpations: Abdomen is soft.   Musculoskeletal:         General: Normal range of motion.      Cervical back: Full passive range of motion without pain, normal range of motion and neck supple.   Skin:     General: Skin is warm and dry.      Capillary Refill: Capillary refill takes less than 2 seconds.   Neurological:      General: No focal deficit present.      Mental Status: She is alert and oriented to person, place, and time.      Deep Tendon Reflexes: Reflexes are normal and symmetric.   Psychiatric:         Mood and Affect: Mood normal.         Speech: Speech normal.         Behavior: Behavior normal.         Thought Content: Thought content normal.         Judgment: Judgment normal.          Assessment and Plan     1. Encounter for health maintenance  examination  Annual wellness exam completed.    All medications, histories, and concerns reviewed, reconciled, and addressed.    Appropriate Screenings per pt's sex and age have been reviewed and discussed with pt.    BMI reviewed.    2. Routine lab draw  -     CBC Auto Differential; Future; Expected date: 07/11/2023  -     Comprehensive Metabolic Panel; Future; Expected date: 07/11/2023  -     Lipid Panel; Future; Expected date: 07/11/2023  -     TSH; Future; Expected date: 07/11/2023  -     HIV 1/2 Ag/Ab (4th Gen); Future; Expected date: 07/11/2023  -     Hepatitis C Antibody; Future; Expected date: 07/11/2023  -     C. trachomatis/N. gonorrhoeae by AMP DNA; Future; Expected date: 07/11/2023    3. Encounter for screening for HIV  -     HIV 1/2 Ag/Ab (4th Gen); Future; Expected date: 07/11/2023    4. Encounter for hepatitis C screening test for low risk patient    5. Screening cholesterol level  -     Lipid Panel; Future; Expected date: 07/11/2023    6. Screen for STD (sexually transmitted disease)  -     C. trachomatis/N. gonorrhoeae by AMP DNA; Future; Expected date: 07/11/2023    7. PPD screening test  -     QUANTIFERON GOLD TB; Future; Expected date: 07/11/2023    8. BMI 24.0-24.9, adult  BMI reviewed    Check annual labs today, will message with results, if stable repeat in 1 yr with annual    Quantiferon Gold Tb screening    Follow up in about 1 year (around 7/11/2024) for annual or sooner as needed with PCP Dr. Flaherty.

## 2023-07-12 LAB
GAMMA INTERFERON BACKGROUND BLD IA-ACNC: 0.02 IU/ML
M TB IFN-G CD4+ BCKGRND COR BLD-ACNC: 0 IU/ML
M TB IFN-G CD4+ BCKGRND COR BLD-ACNC: 0 IU/ML
MITOGEN IGNF BCKGRD COR BLD-ACNC: 9.98 IU/ML
TB GOLD PLUS: NEGATIVE

## 2023-08-04 ENCOUNTER — TELEPHONE (OUTPATIENT)
Dept: PSYCHIATRY | Facility: CLINIC | Age: 23
End: 2023-08-04
Payer: COMMERCIAL

## 2023-08-04 DIAGNOSIS — F41.1 GAD (GENERALIZED ANXIETY DISORDER): Primary | ICD-10-CM

## 2023-08-04 RX ORDER — SERTRALINE HYDROCHLORIDE 50 MG/1
50 TABLET, FILM COATED ORAL DAILY
Qty: 30 TABLET | Refills: 2 | Status: SHIPPED | OUTPATIENT
Start: 2023-08-04 | End: 2024-01-29

## 2023-08-04 NOTE — TELEPHONE ENCOUNTER
Former pt of resident Dr. Sena requesting sertraline refill. L/s in May when plan made to start med and titrate up. Has f/u with new provider Dr. Estrada 10/20/23. Will confer with Dr. Trevino for review/approval bridge supply of requested med.

## 2023-08-14 ENCOUNTER — OFFICE VISIT (OUTPATIENT)
Dept: OBSTETRICS AND GYNECOLOGY | Facility: CLINIC | Age: 23
End: 2023-08-14
Attending: STUDENT IN AN ORGANIZED HEALTH CARE EDUCATION/TRAINING PROGRAM
Payer: COMMERCIAL

## 2023-08-14 VITALS
DIASTOLIC BLOOD PRESSURE: 72 MMHG | BODY MASS INDEX: 24.91 KG/M2 | SYSTOLIC BLOOD PRESSURE: 110 MMHG | WEIGHT: 131.81 LBS

## 2023-08-14 DIAGNOSIS — Z01.419 WELL WOMAN EXAM: Primary | ICD-10-CM

## 2023-08-14 PROCEDURE — 99999 PR PBB SHADOW E&M-EST. PATIENT-LVL III: ICD-10-PCS | Mod: PBBFAC,,, | Performed by: STUDENT IN AN ORGANIZED HEALTH CARE EDUCATION/TRAINING PROGRAM

## 2023-08-14 PROCEDURE — 99395 PREV VISIT EST AGE 18-39: CPT | Mod: S$GLB,,, | Performed by: STUDENT IN AN ORGANIZED HEALTH CARE EDUCATION/TRAINING PROGRAM

## 2023-08-14 PROCEDURE — 99999 PR PBB SHADOW E&M-EST. PATIENT-LVL III: CPT | Mod: PBBFAC,,, | Performed by: STUDENT IN AN ORGANIZED HEALTH CARE EDUCATION/TRAINING PROGRAM

## 2023-08-14 PROCEDURE — 99395 PR PREVENTIVE VISIT,EST,18-39: ICD-10-PCS | Mod: S$GLB,,, | Performed by: STUDENT IN AN ORGANIZED HEALTH CARE EDUCATION/TRAINING PROGRAM

## 2023-08-14 NOTE — PROGRESS NOTES
Chief Complaint: Well Woman Exam     HPI:      Sallie Del Real is a 23 y.o.  who presents today for well woman exam.  LMP: Patient's last menstrual period was 2023 (approximate).  No issues, problems, or complaints.  Specifically, patient denies abnormal vaginal bleeding, discharge, pelvic pain, urinary problems, or changes in appetite. Ms. Del Real is not currently sexually active. She is currently using nuva ring for contraception. She declines STD screening today.    Previous Pap:  NEM    Starting 2nd year!    OB History          0    Para   0    Term   0       0    AB   0    Living   0         SAB   0    IAB   0    Ectopic   0    Multiple   0    Live Births   0               History reviewed. No pertinent past medical history.  Past Surgical History:   Procedure Laterality Date    NASAL TURBINATE REDUCTION  2019    TONSILLECTOMY      WISDOM TOOTH EXTRACTION       Social History     Socioeconomic History    Marital status: Single   Tobacco Use    Smoking status: Never    Smokeless tobacco: Never   Substance and Sexual Activity    Alcohol use: Yes     Alcohol/week: 2.0 standard drinks of alcohol     Types: 2 Standard drinks or equivalent per week     Comment: socially    Drug use: Never    Sexual activity: Never     Family History   Problem Relation Age of Onset    Nephrolithiasis Mother     Hyperlipidemia Father     Nephrolithiasis Sister     Ovarian cysts Sister     No Known Problems Brother     Stroke Maternal Grandmother     Hypertension Maternal Grandfather     Cancer Maternal Grandfather         Colon    Diabetes Maternal Grandfather     Colon cancer Maternal Grandfather     Heart disease Maternal Grandfather     No Known Problems Paternal Grandmother     Breast cancer Neg Hx     Ovarian cancer Neg Hx        Current Outpatient Medications:     cetirizine (ZYRTEC) 10 MG tablet, Take 10 mg by mouth once daily., Disp: , Rfl:     fluticasone propionate  (FLONASE) 50 mcg/actuation nasal spray, 1 spray by Each Nostril route once daily., Disp: , Rfl:     sertraline (ZOLOFT) 50 MG tablet, Take 1 tablet (50 mg total) by mouth once daily., Disp: 30 tablet, Rfl: 2    clindamycin (CLEOCIN T) 1 % external solution, Apply topically 2 (two) times daily. (Patient not taking: Reported on 8/14/2023), Disp: 60 mL, Rfl: 5    etonogestreL-ethinyl estradioL (NUVARING) 0.12-0.015 mg/24 hr vaginal ring, Place 1 each vaginally every 28 days., Disp: 1 each, Rfl: 11    pimecrolimus (ELIDEL) 1 % cream, Apply to the affected area 2 times daily (Patient not taking: Reported on 8/14/2023), Disp: 100 g, Rfl: 3    triamcinolone acetonide 0.1% (KENALOG) 0.1 % cream, Apply topically 2 (two) times daily. (Patient not taking: Reported on 8/14/2023), Disp: 80 g, Rfl: 2    trifarotene (AKLIEF) 0.005 % Crea, Apply 1 application topically every evening. (Patient not taking: Reported on 8/14/2023), Disp: 45 g, Rfl: 5    ROS:     GENERAL: Denies unintentional weight gain or weight loss. Feeling well overall.   SKIN: Denies rash or lesions.   HEENT: Denies headaches, or vision changes.   CARDIOVASCULAR: Denies palpitations or chest pain.   RESPIRATORY: Denies shortness of breath or dyspnea on exertion.  BREASTS: Denies pain, lumps, or nipple discharge.   ABDOMEN: Denies abdominal pain, constipation, diarrhea, nausea, vomiting, change in appetite.  URINARY: Denies frequency, dysuria, hematuria.  NEUROLOGIC: Denies syncope or weakness.   PSYCHIATRIC: Denies depression, anxiety or mood swings.    Physical Exam:      PHYSICAL EXAM:  /72   Wt 59.8 kg (131 lb 13.4 oz)   LMP 07/25/2023 (Approximate)   BMI 24.91 kg/m²   Body mass index is 24.91 kg/m².     APPEARANCE: Well nourished, well developed, in no acute distress.  PSYCH: Appropriate mood and affect.  SKIN: No acne or hirsutism.  NECK: Neck symmetric without masses or thyromegaly.  NODES: No inguinal, axillary, or supraclavicular lymph node  enlargement.  CHEST: Normal respiratory effort.    CARDIOVASCULAR:  Regular rate and rhythm.  BREASTS: Symmetrical, no skin changes or visible lesions.  No palpable masses or nipple discharge bilaterally.  ABDOMEN: Soft.  No tenderness or masses.    PELVIC: Normal external genitalia without lesions.  Normal hair distribution.  Adequate perineal body, normal urethral meatus.  Vagina moist and well rugated without lesions or discharge.  Cervix pink, without lesions, discharge or tenderness.  No significant cystocele or rectocele.  Bimanual exam shows uterus to be normal size, regular, mobile and nontender.  Adnexa without masses or tenderness.    EXTREMITIES: No edema.  No tenderness to palpation.  Declines upt    Assessment/Plan:     23 y.o.     Well woman exam      Counselin.  Annual exam performed today without difficulty.  Patient was counseled today on current ASCCP pap guidelines, the recommendation for yearly pelvic exams, healthy diet and exercise routines, breast self awareness.  Pap smear UTD.  All questions answered.    2.  Contraception:  Desires to continue nuva ring.  R/b/a reviewed and all questions answered.    3.  STD testing:  Declines.  4.  Follow up with PCP for other health maintenance.  5.  Follow up in about 1 year (around 2024).

## 2023-08-21 RX ORDER — ETONOGESTREL AND ETHINYL ESTRADIOL VAGINAL RING .015; .12 MG/D; MG/D
1 RING VAGINAL
Qty: 1 EACH | Refills: 11 | Status: SHIPPED | OUTPATIENT
Start: 2023-08-21 | End: 2024-08-20

## 2023-11-08 ENCOUNTER — PATIENT MESSAGE (OUTPATIENT)
Dept: GASTROENTEROLOGY | Facility: CLINIC | Age: 23
End: 2023-11-08

## 2023-11-08 ENCOUNTER — OFFICE VISIT (OUTPATIENT)
Dept: GASTROENTEROLOGY | Facility: CLINIC | Age: 23
End: 2023-11-08
Payer: COMMERCIAL

## 2023-11-08 ENCOUNTER — LAB VISIT (OUTPATIENT)
Dept: LAB | Facility: HOSPITAL | Age: 23
End: 2023-11-08
Attending: INTERNAL MEDICINE
Payer: COMMERCIAL

## 2023-11-08 VITALS
DIASTOLIC BLOOD PRESSURE: 72 MMHG | BODY MASS INDEX: 26.43 KG/M2 | HEART RATE: 73 BPM | HEIGHT: 61 IN | SYSTOLIC BLOOD PRESSURE: 108 MMHG | WEIGHT: 140 LBS

## 2023-11-08 DIAGNOSIS — K21.9 GASTROESOPHAGEAL REFLUX DISEASE, UNSPECIFIED WHETHER ESOPHAGITIS PRESENT: ICD-10-CM

## 2023-11-08 DIAGNOSIS — R14.2 BELCHING: ICD-10-CM

## 2023-11-08 DIAGNOSIS — R10.13 DYSPEPSIA: ICD-10-CM

## 2023-11-08 DIAGNOSIS — R19.7 DIARRHEA IN ADULT PATIENT: ICD-10-CM

## 2023-11-08 DIAGNOSIS — R19.7 DIARRHEA IN ADULT PATIENT: Primary | ICD-10-CM

## 2023-11-08 LAB
ALBUMIN SERPL BCP-MCNC: 4.2 G/DL (ref 3.5–5.2)
ALP SERPL-CCNC: 42 U/L (ref 55–135)
ALT SERPL W/O P-5'-P-CCNC: 15 U/L (ref 10–44)
AST SERPL-CCNC: 19 U/L (ref 10–40)
BILIRUB DIRECT SERPL-MCNC: 0.1 MG/DL (ref 0.1–0.3)
BILIRUB SERPL-MCNC: 0.4 MG/DL (ref 0.1–1)
FERRITIN SERPL-MCNC: 11 NG/ML (ref 20–300)
HAV IGG SER QL IA: REACTIVE
HBV SURFACE AG SERPL QL IA: NORMAL
HGB BLD-MCNC: 13 G/DL (ref 12–16)
IRON SERPL-MCNC: 157 UG/DL (ref 30–160)
LIPASE SERPL-CCNC: 16 U/L (ref 4–60)
PROT SERPL-MCNC: 7.7 G/DL (ref 6–8.4)
SATURATED IRON: 28 % (ref 20–50)
TOTAL IRON BINDING CAPACITY: 555 UG/DL (ref 250–450)
TRANSFERRIN SERPL-MCNC: 375 MG/DL (ref 200–375)

## 2023-11-08 PROCEDURE — 82728 ASSAY OF FERRITIN: CPT | Performed by: INTERNAL MEDICINE

## 2023-11-08 PROCEDURE — 84466 ASSAY OF TRANSFERRIN: CPT | Performed by: INTERNAL MEDICINE

## 2023-11-08 PROCEDURE — 85018 HEMOGLOBIN: CPT | Performed by: INTERNAL MEDICINE

## 2023-11-08 PROCEDURE — 86790 VIRUS ANTIBODY NOS: CPT | Performed by: INTERNAL MEDICINE

## 2023-11-08 PROCEDURE — 99999 PR PBB SHADOW E&M-EST. PATIENT-LVL III: ICD-10-PCS | Mod: PBBFAC,,, | Performed by: INTERNAL MEDICINE

## 2023-11-08 PROCEDURE — 86364 TISS TRNSGLTMNASE EA IG CLAS: CPT | Mod: 59 | Performed by: INTERNAL MEDICINE

## 2023-11-08 PROCEDURE — 83690 ASSAY OF LIPASE: CPT | Performed by: INTERNAL MEDICINE

## 2023-11-08 PROCEDURE — 99204 PR OFFICE/OUTPT VISIT, NEW, LEVL IV, 45-59 MIN: ICD-10-PCS | Mod: S$GLB,,, | Performed by: INTERNAL MEDICINE

## 2023-11-08 PROCEDURE — 83540 ASSAY OF IRON: CPT | Performed by: INTERNAL MEDICINE

## 2023-11-08 PROCEDURE — 83520 IMMUNOASSAY QUANT NOS NONAB: CPT | Performed by: INTERNAL MEDICINE

## 2023-11-08 PROCEDURE — 87340 HEPATITIS B SURFACE AG IA: CPT | Performed by: INTERNAL MEDICINE

## 2023-11-08 PROCEDURE — 86753 PROTOZOA ANTIBODY NOS: CPT | Performed by: INTERNAL MEDICINE

## 2023-11-08 PROCEDURE — 80076 HEPATIC FUNCTION PANEL: CPT | Performed by: INTERNAL MEDICINE

## 2023-11-08 PROCEDURE — 83497 ASSAY OF 5-HIAA: CPT | Performed by: INTERNAL MEDICINE

## 2023-11-08 PROCEDURE — 86682 HELMINTH ANTIBODY: CPT | Performed by: INTERNAL MEDICINE

## 2023-11-08 PROCEDURE — 86706 HEP B SURFACE ANTIBODY: CPT | Performed by: INTERNAL MEDICINE

## 2023-11-08 PROCEDURE — 36415 COLL VENOUS BLD VENIPUNCTURE: CPT | Performed by: INTERNAL MEDICINE

## 2023-11-08 PROCEDURE — 99999 PR PBB SHADOW E&M-EST. PATIENT-LVL III: CPT | Mod: PBBFAC,,, | Performed by: INTERNAL MEDICINE

## 2023-11-08 PROCEDURE — 99204 OFFICE O/P NEW MOD 45 MIN: CPT | Mod: S$GLB,,, | Performed by: INTERNAL MEDICINE

## 2023-11-08 NOTE — PROGRESS NOTES
"GENERAL GI PATIENT INTAKE:    COVID symptoms in the last 7 days (runny nose, sore throat, congestion, cough, fever): No  PCP: Shaina Flaherty  If not PCP-  number given to establish [phone]: N/A    ALLERGIES REVIEWED:  Yes    CHIEF COMPLAINT:    Chief Complaint   Patient presents with    Diarrhea    Gas       VITAL SIGNS:  /72   Pulse 73   Ht 5' 1" (1.549 m)   Wt 63.5 kg (139 lb 15.9 oz)   BMI 26.45 kg/m²      Change in medical, surgical, family or social history: No      REVIEWED MEDICATION LIST RECONCILED INCLUDING ABOVE MEDS:  No      "

## 2023-11-08 NOTE — PROGRESS NOTES
[facility] Consultation Note    Reason for Consult:  The primary encounter diagnosis was Diarrhea in adult patient. Diagnoses of Belching, Gastroesophageal reflux disease, unspecified whether esophagitis present, and Dyspepsia were also pertinent to this visit.    PCP:   Shaina Flaherty   No address on file    Referring MD:  Self, Aaareferral  No address on file    Initial History of Present Illness (HPI):  This is a 23 y.o. female here for evaluation of chronic GI symptoms that have been going on for maybe about 3 years patient has belching occasional GERD symptoms no history of any food impactions maybe sometimes food difficult to go down the esophagus but not significantly no weight loss at all does have diarrhea symptoms anywhere between 1-3 bowel movements a day pasty or liquid no blood in her stool does have a family history mom's dad with colon cancer well after the age of 60 no other family members with colon cancer nobody with advanced adenomas colon polyps nobody with esophageal or stomach cancer nobody with Barretts esophagus nobody with pancreatitis pancreatic cancer liver cancer small-bowel cancer ovarian or uterine cancer nobody with kidney or bladder cancer nobody with Crohn's or ulcerative colitis.  Does not think she is lactase deficient.  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  She is interested in further evaluation recent lab work reviewed will do additional lab work and stool studies start if unrevealing and patient is interested will plan for EGD plus or minus colonoscopy for further evaluation once lab work returns and stool studies return we can consider a trial of a  PPI.    Abdominal pain - no  Reflux - as above  Dysphagia - as above  Bowel habits -as above  GI bleeding - none  NSAID usage - none    Interval HPI 11/08/2023:  The patient's last visit with me was on Visit date not found.      ROS:  Constitutional: No fevers, chills, No weight loss  ENT:  As above heartburn as above dysphagia no odynophagia no hoarseness  CV: No chest pain, no palpitation  Pulm: No cough, No shortness of breath, no wheezing  Ophtho: No vision changes  GI: see HPI  Derm: No rash, no itching  Heme: No lymphadenopathy, No easy bruising  MSK: No significant arthritis  : No dysuria, No hematuria  Endo: No hot or cold intolerance  Neuro: No syncope, No seizure, no strokes  Psych: No uncontrolled anxiety, No uncontrolled depression    Medical History:  has no past medical history on file.    Surgical History:  has a past surgical history that includes Nasal turbinate reduction (12/2019); Tonsillectomy; and Rice tooth extraction.    Family History: family history includes Cancer in her maternal grandfather; Colon cancer in her maternal grandfather; Diabetes in her maternal grandfather; Heart disease in her maternal grandfather; Hyperlipidemia in her father; Hypertension in her maternal grandfather; Nephrolithiasis in her mother and sister; No Known Problems in her brother and paternal grandmother; Ovarian cysts in her sister; Stroke in her maternal grandmother..     Social History:  reports that she has never smoked. She has never used smokeless tobacco. She reports current alcohol use of about 2.0 standard drinks of alcohol per week. She reports that she does not use drugs.    Review of patient's allergies indicates:   Allergen Reactions    Shellfish containing products      Other reaction(s): hives       Medication List with Changes/Refills   Current Medications    ETONOGESTREL-ETHINYL ESTRADIOL (NUVARING) 0.12-0.015 MG/24 HR VAGINAL RING    Place 1 each vaginally every 28 days.    FLUTICASONE  "PROPIONATE (FLONASE) 50 MCG/ACTUATION NASAL SPRAY    1 spray by Each Nostril route once daily.    SERTRALINE (ZOLOFT) 50 MG TABLET    Take 1 tablet (50 mg total) by mouth once daily.   Discontinued Medications    CETIRIZINE (ZYRTEC) 10 MG TABLET    Take 10 mg by mouth once daily.    CLINDAMYCIN (CLEOCIN T) 1 % EXTERNAL SOLUTION    Apply topically 2 (two) times daily.    PIMECROLIMUS (ELIDEL) 1 % CREAM    Apply to the affected area 2 times daily    TRIAMCINOLONE ACETONIDE 0.1% (KENALOG) 0.1 % CREAM    Apply topically 2 (two) times daily.    TRIFAROTENE (AKLIEF) 0.005 % CREA    Apply 1 application topically every evening.         Objective Findings:    Vital Signs:  /72   Pulse 73   Ht 5' 1" (1.549 m)   Wt 63.5 kg (139 lb 15.9 oz)   BMI 26.45 kg/m²   Body mass index is 26.45 kg/m².    Physical Exam:  General Appearance: Well appearing in no acute distress  Eyes:    No scleral icterus  ENT:  No lesions or masses   Lungs: CTA bilaterally, no wheezes, no rhonchi, no rales  Heart:  S1, S2 normal, no murmurs heard  Abdomen:  Non distended, soft, no guarding, no rebound, no tenderness, no appreciated ascites, no bruits, no hepatosplenomegaly,  No CVA tenderness, no appreciated hernias, no Cruz sign, no McBurney point tenderness  Musculoskeletal:  No major joint deformities  Skin: No petechiae or rash on exposed skin areas  Neurologic:  Alert and oriented x4  Psychiatric:  Normal speech mentation and affect    Labs:  Lab Results   Component Value Date    WBC 5.62 07/11/2023    HGB 12.4 07/11/2023    HCT 38.3 07/11/2023     07/11/2023    CHOL 186 07/11/2023    TRIG 107 07/11/2023    HDL 54 07/11/2023    ALT 16 07/11/2023    AST 16 07/11/2023     07/11/2023    K 4.4 07/11/2023     07/11/2023    CREATININE 0.7 07/11/2023    BUN 16 07/11/2023    CO2 22 (L) 07/11/2023    TSH 1.866 07/11/2023         Medical Decision Making:  Lab work talk given stools talk given  For future EGD colonoscopy talk " given  Prior lab work reviewed    Assessment:  1. Diarrhea in adult patient    2. Belching    3. Gastroesophageal reflux disease, unspecified whether esophagitis present    4. Dyspepsia         Recommendations:  1. Lab work today  2. Stool studies to be turned in within 4 hours of collecting them [facility] floor [facility] Monday through Friday between 7:00 a.m. and 4:00 p.m.  3. Return GI clinic 4-6 weeks okay for telemedicine video visit    No follow-ups on file.      Order summary:  Orders Placed This Encounter    Clostridium difficile EIA    Stool culture    Celiac Disease Panel    Lipase    Strongyloides IgG Antibodies    Anti-Ent. Histolytica Ab    Cyclospora    Micropsoridia species, PCR    5-HIAA Plasma (Neuroendocrine)    TRYPTASE    Fecal fat, qualitative    Pancreatic elastase, fecal    Giardia / Cryptosporidum, EIA    Stool Exam-Ova,Cysts,Parasites    H. pylori antigen, stool    Hepatic Function Panel    Iron and TIBC    Ferritin    Hemoglobin    Hepatitis B Surface Antibody, Qual/Quant    Hepatitis B Surface Antigen    HEPATITIS A ANTIBODY, IGG         Thank you so much for allowing me to participate in the care of Sallie Haydenshane Hanson MD    DISCLAIMER: This note was prepared with [employer] voice recognition transcription software. Garbled syntax, mangled or inadvertent pronouns, and other bizarre constructions may be attributed to that software system. While efforts were made to correct any mistakes made by this voice recognition program, some errors and/or omissions may remain in the note that were missed when the note was originally created.

## 2023-11-08 NOTE — Clinical Note
GI MA team Please schedule patient 2nd floor lab work today Stool studies to be turned in within 4 hours of collecting 2nd floor [facility] clinic 4-6 weeks telemedicine video visit for follow-up

## 2023-11-09 LAB — E HISTOLYT AB SER QL: NEGATIVE

## 2023-11-10 LAB
5OH-INDOLEACETATE SERPL-MCNC: 5 NG/ML
GLIADIN PEPTIDE IGA SER-ACNC: 1 U/ML
GLIADIN PEPTIDE IGG SER-ACNC: <0.6 U/ML
HBV SURFACE AB SER QL IA: POSITIVE
HBV SURFACE AB SERPL IA-ACNC: 416 MIU/ML
IGA SERPL-MCNC: 189 MG/DL (ref 70–400)
STRONGYLOIDES ANTIBODY IGG: NEGATIVE
TRYPTASE LEVEL: 3.2 NG/ML
TTG IGA SER-ACNC: 0.3 U/ML
TTG IGG SER-ACNC: 0.6 U/ML

## 2023-11-12 ENCOUNTER — PATIENT MESSAGE (OUTPATIENT)
Dept: GASTROENTEROLOGY | Facility: CLINIC | Age: 23
End: 2023-11-12
Payer: COMMERCIAL

## 2023-11-12 DIAGNOSIS — E61.1 IRON DEFICIENCY: Primary | ICD-10-CM

## 2023-11-12 RX ORDER — FERROUS GLUCONATE 324(38)MG
324 TABLET ORAL
Qty: 90 TABLET | Refills: 1 | Status: SHIPPED | OUTPATIENT
Start: 2023-11-12 | End: 2024-05-10

## 2023-11-12 NOTE — PROGRESS NOTES
GI MA team - please tell patient that they are iron deficient but not anemic and recommend that they take ferrous gluconate one 324mg pill once daily for next 3 months.    GI MA team -  Please order repeat fasting Hemoglobin, Iron/TIBC, and Ferritin in 8 weeks - Orders placed.     Sallie please let me know with your iron deficiency if you would like to proceed with further evaluation with her an upper endoscopy in light of your diarrhea and or a colonoscopy as well for further evaluation.    You do have immunity to both viral hepatitis A and hepatitis B which is a great thing

## 2023-11-13 ENCOUNTER — OFFICE VISIT (OUTPATIENT)
Dept: PSYCHIATRY | Facility: CLINIC | Age: 23
End: 2023-11-13
Payer: COMMERCIAL

## 2023-11-13 VITALS
SYSTOLIC BLOOD PRESSURE: 108 MMHG | DIASTOLIC BLOOD PRESSURE: 72 MMHG | WEIGHT: 139.75 LBS | BODY MASS INDEX: 26.41 KG/M2 | HEART RATE: 70 BPM

## 2023-11-13 DIAGNOSIS — F41.1 GAD (GENERALIZED ANXIETY DISORDER): Primary | ICD-10-CM

## 2023-11-13 DIAGNOSIS — F32.A DEPRESSION, UNSPECIFIED DEPRESSION TYPE: ICD-10-CM

## 2023-11-13 DIAGNOSIS — F41.1 GENERALIZED ANXIETY DISORDER: ICD-10-CM

## 2023-11-13 DIAGNOSIS — R41.840 INATTENTION: ICD-10-CM

## 2023-11-13 PROCEDURE — 99999 PR PBB SHADOW E&M-EST. PATIENT-LVL I: ICD-10-PCS | Mod: PBBFAC,,, | Performed by: STUDENT IN AN ORGANIZED HEALTH CARE EDUCATION/TRAINING PROGRAM

## 2023-11-13 PROCEDURE — 99999 PR PBB SHADOW E&M-EST. PATIENT-LVL I: CPT | Mod: PBBFAC,,, | Performed by: STUDENT IN AN ORGANIZED HEALTH CARE EDUCATION/TRAINING PROGRAM

## 2023-11-13 PROCEDURE — 99215 OFFICE O/P EST HI 40 MIN: CPT | Mod: S$GLB,,, | Performed by: STUDENT IN AN ORGANIZED HEALTH CARE EDUCATION/TRAINING PROGRAM

## 2023-11-13 PROCEDURE — 99215 PR OFFICE/OUTPT VISIT, EST, LEVL V, 40-54 MIN: ICD-10-PCS | Mod: S$GLB,,, | Performed by: STUDENT IN AN ORGANIZED HEALTH CARE EDUCATION/TRAINING PROGRAM

## 2023-11-13 RX ORDER — BUPROPION HYDROCHLORIDE 150 MG/1
150 TABLET ORAL DAILY
Qty: 30 TABLET | Refills: 2 | Status: SHIPPED | OUTPATIENT
Start: 2023-11-13 | End: 2024-01-29 | Stop reason: SDUPTHER

## 2023-11-13 NOTE — PROGRESS NOTES
OUTPATIENT PSYCHIATRY FOLLOW-UP VISIT    ENCOUNTER DATE:  11/13/2023  SITE:  [facility]  REFFERAL SOURCE:  Elena Landrum MD  LENGTH OF SESSION:  45 minutes        CHIEF COMPLAINT:   Anxiety, inattention      HISTORY OF PRESENTING ILLNESS:  Sallie Del Real is a 23 y.o. female with history of DERREK, depression who presents for assessment. Patient was previously seeing Dr. Sena, last visit 5/29/23.   Patient discussed initially establishing care to address her anxiety. Was previously started on Zoloft 25 mg, titrated up to 50 mg. Patient noted improvement in anxiety, however experienced side effects. Endorsed feeling as though her emotions were blunted, had sexual side effects, increased appetite, fatigue requiring daily naps. She tapered herself off the medication prior to presenting back to clinic, took last dose on Saturday.   Despite improvement in anxiety, patient feels like her attention is still impaired and her mood is apathetic at times. She does not endorse activities that she feels like she looks forward to other than sleeping. She reports fatigue, increased appetite, poor concentration, and feeling bad about self. She denied issues with falling asleep or staying asleep. Denied SI, HI, AVH. Yasmine screen negative. 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 Patient identifies activities she enjoys -- running and cooking.   Patient shares she is interested in trying Wellbutrin to address her symptoms due to side effects experienced on SSRI.       PSYCHIATRIC REVIEW OF SYMPTOMS:     Symptoms of Depression: some decreased interest, depressed mood, fatigue, poor concentration, feeling bad about self, increased appetite     Patient Health Questionnaire-2 (PHQ-2)  Over the last 2 weeks, how often have you been bothered by the following problems?    Little interest or pleasure in doing things +1 - Several days   Feeling down, depressed, and hopeless +1 -  Several days   Total score (>3 considered positive screen) 2   Follow up positive screen with PHQ-9    Patient Health Questionnaire-9 (PHQ-9)  Over the last 2 weeks, how often have you been bothered by the following problems?    Little interest or pleasure in doing things +1 - Several days   Feeling down, depressed, and hopeless +1 - Several days   Trouble falling or staying asleep, or sleeping too much + 0 - Not at all   Feeling tired or having little energy +3 - Nearly every day   Poor appetite or overeating +2 - More than half the days   Feeling bad about yourself - or that you are a failure or have let yourself or your family down +1 - Several days   Trouble concentrating on things, such as reading the newspaper or watching television +3 - Nearly every day   Moving or speaking so slowly that other people could have noticed? Or so fidgety or restless that you have been moving a lot more than usual + 0 - Not at all   Thoughts that you would be better off dead, or thoughts of hurting yourself in some way + 0 - Not at all       Total score 11    Moderate depression (10-14)       Symptoms of DERREK: restlessness, easily annoyed/irritated     Generalized Anxiety Disorder 7-item (GAD7)  Over the last 2 weeks, how often have you been bothered by the following problems?    Feeling nervous, anxious or on edge + 0 - Not at all   Not being able to stop or control worrying + 0 - Not at all   Worrying too much about different things + 0 - Not at all   Trouble relaxing + 0 - Not at all   Being so restless that it is hard to sit still +1 - Several days   Becoming easily annoyed or irritable +1 - Several days   Feeling afraid as if something awful might happen + 0 - Not at all       Total score 2    Minimal anxiety (0-4)       Symptoms of Panic Attacks: denied    Symptoms of lena or hypomania: denied    Symptoms of psychosis: denied    Symptoms of PTSD: denied     Sleep: no issues    Other Psych ROS:    Symptoms of OCD: some  "patterns such as keeping volume on certain numbers, chewing gun    Symptoms of Eating Disorders: denied    Symptoms of ADHD: issues with focus    Adult ADHD Self-Report Scale    PART A: Screening  How often do you have trouble wrapping up the final details of a project,  once the challenging parts have been done? Rarely   How often do you have difficulty getting things in order when you have to do  a task that requires organization? Rarely   How often do you have problems remembering appointments or obligations? Rarely   Score number of items more frequent than "Rarely" 0   When you have a task that requires a lot of thought, how often do you avoid  or delay getting started? Often   How often do you fidget or squirm with your hands or feet when you have  to sit down for a long time? Sometimes   How often do you feel overly active and compelled to do things, like you  were driven by a motor? Rarely   Score number of items more frequent than "Sometimes" 1   Total score (>4 indicative of ADHD) 1     PART B: Additional symptoms, no score  How often do you make careless mistakes when you have to work on a boring or  difficult project? Rarely   How often do you have difficulty keeping your attention when you are doing boring  or repetitive work? Often   How often do you have difficulty concentrating on what people say to you,  even when they are speaking to you directly? Often   How often do you misplace or have difficulty finding things at home or at work? Sometimes   How often are you distracted by activity or noise around you? Often   How often do you leave your seat in meetings or other situations in which  you are expected to remain seated? Rarely   How often do you feel restless or fidgety? Sometimes   How often do you have difficulty unwinding and relaxing when you have time  to yourself? Sometimes   How often do you find yourself talking too much when you are in social situations? Often   When youre in a " conversation, how often do you find yourself finishing  the sentences of the people you are talking to, before they can finish  them themselves? Sometimes   How often do you have difficulty waiting your turn in situations when  turn taking is required? Sometimes   How often do you interrupt others when they are busy? Sometimes     Described impairments: difficulty focusing especially when studying, easily distracted by activity in surroundings, daydreaming, procrastination   Childhood history or previous diagnosis: denied, denied issues in college      Risk Parameters:  Patient reports no suicidal ideation  Patient reports no homicidal ideation  Patient reports no self-injurious behavior  Patient reports no violent behavior    PSYCHIATRIC MED REVIEW    Current psych meds  Medication side effects:  Zoloft - feeling blunted, fatigue, sexual side effects, increased appetite   Medication compliance:  stopped Zoloft about 3 days ago     Previous psych meds trials    PAST PSYCHIATRIC HISTORY:  Previous Psychiatric Diagnoses: DERREK  Previous Psychiatric Hospitalizations: no   Previous SI/HI: no  Previous Suicide Attempts: no  Previous Self injurious behaviors: no  Previous Medication Trials:  Zoloft  Psychiatric Care (current & past):  was seeing Dr. Sena   History of Psychotherapy: went to therapy last year for a few months   History of Violence: no    SUBSTANCE ABUSE HISTORY:  Caffeine: 150 mg - 200 mg daily  Tobacco: no  Alcohol: every weekend, ~5 drinks in a day   Illicit Substances: no  Misuse of Prescription Medications: no  Other: Herbal supplements/online supplements - no    If positve history  Detoxes:  no  Rehabs:  no  12 Step Meetings:  n/a  Periods of Sobriety:  n/a  Withdrawal: n/a    FAMILY HISTORY:  Psychiatric: sister - on Zoloft for depression and anxiety, maternal aunt - unknown diagnosis, maternal grandmother - depression  Family H/o suicide: no    SOCIAL HISTORY:  Developmental/Childhood:Achieved all  developmental milestones timely  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    ALL MEDICATIONS:    Current Outpatient Medications:     etonogestreL-ethinyl estradioL (NUVARING) 0.12-0.015 mg/24 hr vaginal ring, Place 1 each vaginally every 28 days., Disp: 1 each, Rfl: 11    ferrous gluconate (FERGON) 324 MG tablet, Take 1 tablet (324 mg total) by mouth daily with breakfast., Disp: 90 tablet, Rfl: 1    fluticasone propionate (FLONASE) 50 mcg/actuation nasal spray, 1 spray by Each Nostril route once daily., Disp: , Rfl:     sertraline (ZOLOFT) 50 MG tablet, Take 1 tablet (50 mg total) by mouth once daily., Disp: 30 tablet, Rfl: 2    ALLERGIES:  Review of patient's allergies indicates:   Allergen Reactions    Shellfish containing products      Other reaction(s): hives       RELEVANT LABS/STUDIES:    Lab Results   Component Value Date    WBC 5.62 07/11/2023    HGB 13.0 11/08/2023    HCT 38.3 07/11/2023    MCV 89 07/11/2023     07/11/2023     BMP  Lab Results   Component Value Date     07/11/2023    K 4.4 07/11/2023     07/11/2023    CO2 22 (L) 07/11/2023    BUN 16 07/11/2023    CREATININE 0.7 07/11/2023    CALCIUM 9.2 07/11/2023    ANIONGAP 9 07/11/2023    ESTGFRAFRICA >60.0 06/16/2022    EGFRNONAA >60.0 06/16/2022     Lab Results   Component Value Date    ALT 15 11/08/2023    AST 19 11/08/2023    ALKPHOS 42 (L) 11/08/2023    BILITOT 0.4 11/08/2023     Lab Results   Component Value Date    TSH 1.866 07/11/2023  "    No results found for: "LABA1C", "HGBA1C"      VITALS  Vitals:    11/13/23 0830   BP: 108/72   Pulse: 70   Weight: 63.4 kg (139 lb 12.4 oz)       PHYSICAL EXAM  General: well developed, well nourished, no distress  Neurologic:   Gait: Normal   Psychomotor signs:  No involuntary movements or tremor  AIMS: none    PSYCHIATRIC EXAM:    Mental Status Exam:  Appearance: unremarkable, age appropriate, casually dressed, neatly groomed  Behavior/Cooperation: appropriate normal, cooperative, eye contact normal  Speech:  normal tone, normal rate, normal volume  Language: uses words appropriately; NO aphasia or dysarthria  Mood:  "content"   Affect: appropriate, reactive   Thought Process: goal-directed, logical, future-oriented  Thought Content: normal, no suicidality, no homicidality, delusions, or paranoia  Level of Consciousness: Alert and Oriented x3  Memory:  Intact  Attention/concentration: appropriate for age/education.   Fund of Knowledge: intact to conversation  Insight:  Intact - patient has awareness of current condition(s)  Judgment: Intact - behavior adequate for circumstances      IMPRESSION:    Sallie Del Real is a 23 y.o. female with history of DERREK, depression who presents for assessment. Patient had improvement of anxiety on Zoloft, however experienced side effects so she stopped taking. Anxiety remains improved, but other symptoms remain with poor concentration, mood, fatigue, appetite, decreased interest. Discussed with patient this is less likely ADHD due to history and self-report scale. Patient interested in trial of Wellbutrin. Counseled on need to monitor for return of anxiety symptoms.     DIAGNOSES:    ICD-10-CM ICD-9-CM   1. DERREK (generalized anxiety disorder)  F41.1 300.02   2. Depression, unspecified depression type  F32.A 311   3. Inattention  R41.840 799.51       PLAN:  Discontinue Zoloft due to side effects and patient stopping.  Start Wellbutrin  mg daily for anxiety, inattention, " mood due to side effects on SSRI. Counseled patient on monitoring for return of anxiety symptoms.   PMDP reviewed: yes  Lifestyle modifications to reduce symptoms non-pharmacologically (ex: sleep, exercise, diet, etc).  Consider future establishing with psychotherapy  Limit substance use as drugs/alcohol can exacerbate psychiatric symptoms.  Sleep hygiene    RTC 2 months or sooner if needed    Discussed with patient verbal informed consent including: risks and benefits of proposed treatment above vs. alternative treatments vs. no treatment, serious and common side effects of these respective treatments, as well as the inherent unpredictability of individual responses to any treatments, contingency plans if adverse reactions occur, precautions in which to me through Epic MyChart portal or call the clinic, and precautions in which to call 911 and/or go to the emergency room. The patient expresses understanding of the above and displays the capacity to agree with this current plan. All questions were answered.    Staff psychiatrist: Dr. Boothe.    Total of 60 minutes spent today on encounter, including chart review,  review, seeing patient, documenting encounter, ordering medications.    Elena Landrum MD  [facility], PGY-III  [facility]

## 2023-12-15 ENCOUNTER — LAB VISIT (OUTPATIENT)
Dept: LAB | Facility: HOSPITAL | Age: 23
End: 2023-12-15
Attending: INTERNAL MEDICINE
Payer: COMMERCIAL

## 2023-12-15 DIAGNOSIS — R19.7 DIARRHEA IN ADULT PATIENT: ICD-10-CM

## 2023-12-15 DIAGNOSIS — R10.13 DYSPEPSIA: ICD-10-CM

## 2023-12-15 DIAGNOSIS — R14.2 BELCHING: ICD-10-CM

## 2023-12-15 DIAGNOSIS — K21.9 GASTROESOPHAGEAL REFLUX DISEASE, UNSPECIFIED WHETHER ESOPHAGITIS PRESENT: ICD-10-CM

## 2023-12-15 LAB
C DIFF GDH STL QL: NEGATIVE
C DIFF TOX A+B STL QL IA: NEGATIVE

## 2023-12-15 PROCEDURE — 87015 SPECIMEN INFECT AGNT CONCNTJ: CPT | Performed by: INTERNAL MEDICINE

## 2023-12-15 PROCEDURE — 87209 SMEAR COMPLEX STAIN: CPT | Performed by: INTERNAL MEDICINE

## 2023-12-15 PROCEDURE — 82705 FATS/LIPIDS FECES QUAL: CPT | Performed by: INTERNAL MEDICINE

## 2023-12-15 PROCEDURE — 87045 FECES CULTURE AEROBIC BACT: CPT | Performed by: INTERNAL MEDICINE

## 2023-12-15 PROCEDURE — 87449 NOS EACH ORGANISM AG IA: CPT | Performed by: INTERNAL MEDICINE

## 2023-12-15 PROCEDURE — 87338 HPYLORI STOOL AG IA: CPT | Performed by: INTERNAL MEDICINE

## 2023-12-15 PROCEDURE — 87329 GIARDIA AG IA: CPT | Performed by: INTERNAL MEDICINE

## 2023-12-15 PROCEDURE — 87449 NOS EACH ORGANISM AG IA: CPT | Mod: 91 | Performed by: INTERNAL MEDICINE

## 2023-12-15 PROCEDURE — 87207 SMEAR SPECIAL STAIN: CPT | Mod: 59 | Performed by: INTERNAL MEDICINE

## 2023-12-15 PROCEDURE — 87798 DETECT AGENT NOS DNA AMP: CPT | Performed by: INTERNAL MEDICINE

## 2023-12-15 PROCEDURE — 82653 EL-1 FECAL QUANTITATIVE: CPT | Performed by: INTERNAL MEDICINE

## 2023-12-15 PROCEDURE — 87046 STOOL CULTR AEROBIC BACT EA: CPT | Performed by: INTERNAL MEDICINE

## 2023-12-15 PROCEDURE — 87427 SHIGA-LIKE TOXIN AG IA: CPT | Mod: 59 | Performed by: INTERNAL MEDICINE

## 2023-12-17 LAB — CYCLOSPORA STL SAFRANIN STN: NORMAL

## 2023-12-18 LAB
BACTERIA STL CULT: NORMAL
E COLI SXT1 STL QL IA: NEGATIVE
E COLI SXT2 STL QL IA: NEGATIVE
FAT STL QL: NORMAL
NEUTRAL FAT STL QL: NORMAL
O+P STL MICRO: NORMAL

## 2023-12-19 LAB
CRYPTOSP AG STL QL IA: NEGATIVE
ELASTASE 1, FECAL: >500 MCG/G
G LAMBLIA AG STL QL IA: NEGATIVE

## 2023-12-20 LAB
ENCEPHALITOZOON BIENEUSI: NEGATIVE
ENCEPHALITOZOON SPECIES: NEGATIVE
MICROSPORIDIA SPECIMEN SOURCE: NORMAL

## 2023-12-22 LAB — H PYLORI AG STL QL IA: NOT DETECTED

## 2024-01-05 ENCOUNTER — OFFICE VISIT (OUTPATIENT)
Dept: URGENT CARE | Facility: CLINIC | Age: 24
End: 2024-01-05
Payer: COMMERCIAL

## 2024-01-05 VITALS
BODY MASS INDEX: 25.49 KG/M2 | TEMPERATURE: 99 F | HEART RATE: 91 BPM | SYSTOLIC BLOOD PRESSURE: 127 MMHG | RESPIRATION RATE: 16 BRPM | OXYGEN SATURATION: 99 % | HEIGHT: 61 IN | WEIGHT: 135 LBS | DIASTOLIC BLOOD PRESSURE: 78 MMHG
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
## 2024-01-05 DIAGNOSIS — S20.211A RIB CONTUSION, RIGHT, INITIAL ENCOUNTER: Primary | ICD-10-CM

## 2024-01-05 PROCEDURE — 99213 OFFICE O/P EST LOW 20 MIN: CPT | Mod: S$GLB,,, | Performed by: PHYSICIAN ASSISTANT

## 2024-01-05 PROCEDURE — 71101 X-RAY EXAM UNILAT RIBS/CHEST: CPT | Mod: RT,S$GLB,, | Performed by: RADIOLOGY

## 2024-01-05 RX ORDER — IBUPROFEN 800 MG/1
800 TABLET ORAL 3 TIMES DAILY
Qty: 21 TABLET | Refills: 0 | Status: SHIPPED | OUTPATIENT
Start: 2024-01-05

## 2024-01-05 RX ORDER — METHOCARBAMOL 750 MG/1
750 TABLET, FILM COATED ORAL 3 TIMES DAILY
Qty: 15 TABLET | Refills: 0 | Status: SHIPPED | OUTPATIENT
Start: 2024-01-05

## 2024-01-05 NOTE — PATIENT INSTRUCTIONS
You must understand that you've received an Urgent Care treatment only and that you may be released before all your medical problems are known or treated. You, the patient, will arrange for follow up care as instructed.      Follow up with your PCP or specialty clinic as instructed in the next 2-3 days if not improved or as needed. You can call [phone] to schedule an appointment with appropriate provider.      If you condition worsens, we recommend that you receive another evaluation at the emergency room immediately or contact your primary medical clinic's after hours call service to discuss your concerns.      Please return here or go to the Emergency Department for any concerns or worsening condition.      If you were prescribed a narcotic or controlled substance, do not drive or operate heavy equipment or machinery while taking these medications.

## 2024-01-05 NOTE — PROGRESS NOTES
"Subjective:      Patient ID: Sallie Del Real is a 23 y.o. female.    Vitals:  height is 5' 1" (1.549 m) and weight is 61.2 kg (135 lb). Her oral temperature is 98.7 °F (37.1 °C). Her blood pressure is 127/78 and her pulse is 91. Her respiration is 16 and oxygen saturation is 99%.     Chief Complaint: Rib Injury (Right side)    Pt fell while snow boarding 8 days ago. She said the pain is becoming worse when breathing, sneezing, laughing, ect.    Patient provider note starts here:  Patient presents with complaints of right sided chest wall pain after a fall while snowboarding 8 days ago. Reports that the pain is over the lateral and anterior aspect of the lower ribs on the right. Denies ecchymosis. Pain worsened with palpation over the area, coughing and laughing. Has been taking Tylenol. Denies abdominal pain.     Other  This is a new problem. Episode onset: 8 days ago. The problem occurs constantly. The problem has been gradually worsening. Pertinent negatives include no abdominal pain, chest pain, fever, neck pain, numbness or vomiting. She has tried nothing for the symptoms.       Constitution: Negative for fever.   Neck: Negative for neck pain.   Cardiovascular:  Negative for chest pain, palpitations and sob on exertion.   Respiratory:  Negative for chest tightness and wheezing.    Gastrointestinal:  Negative for abdominal pain, vomiting and diarrhea.   Musculoskeletal:  Positive for pain and trauma.   Skin:  Negative for color change and wound.   Neurological:  Negative for numbness and tingling.      Objective:     Physical Exam   Constitutional: She is oriented to person, place, and time. She appears well-developed. She is cooperative.  Non-toxic appearance. She does not appear ill. No distress.   HENT:   Head: Normocephalic and atraumatic.   Ears:   Right Ear: Hearing, tympanic membrane, external ear and ear canal normal.   Left Ear: Hearing, tympanic membrane, external ear and ear canal normal.   Nose: " Nose normal. No mucosal edema, rhinorrhea or nasal deformity. No epistaxis. Right sinus exhibits no maxillary sinus tenderness and no frontal sinus tenderness. Left sinus exhibits no maxillary sinus tenderness and no frontal sinus tenderness.   Mouth/Throat: Uvula is midline, oropharynx is clear and moist and mucous membranes are normal. No trismus in the jaw. Normal dentition. No uvula swelling. No oropharyngeal exudate, posterior oropharyngeal edema or posterior oropharyngeal erythema.   Eyes: Conjunctivae and lids are normal. No scleral icterus.   Neck: Trachea normal and phonation normal. Neck supple. No edema present. No erythema present. No neck rigidity present.   Cardiovascular: Normal rate, regular rhythm, normal heart sounds and normal pulses.   Pulmonary/Chest: Effort normal and breath sounds normal. No respiratory distress. She has no decreased breath sounds. She has no rhonchi. She exhibits tenderness (Reproducible tenderness to the lower chest wall on the right over the lateral and anterior aspects. There is no crepitus or step off appreciated. No ecchymosis. Equal chest expansion noted bilaterally.).   Abdominal: Normal appearance. Soft. There is no abdominal tenderness. There is no rebound and no guarding.   Musculoskeletal: Normal range of motion.         General: No deformity. Normal range of motion.   Neurological: She is alert and oriented to person, place, and time. She exhibits normal muscle tone. Coordination normal.   Skin: Skin is warm, dry, intact, not diaphoretic and not pale.   Psychiatric: Her speech is normal and behavior is normal. Judgment and thought content normal.   Nursing note and vitals reviewed.      Assessment:     1. Rib contusion, right, initial encounter    2. Fall, initial encounter      XR RIB RIGHT W/ PA CHEST  Result Date: 1/5/2024  EXAMINATION: XR RIBS MIN 3 VIEWS W/ PA CHEST RIGHT TECHNIQUE: Four views were obtained, with a PA chest radiograph and AP, oblique, and  subdiaphragmatic AP views of the right sided ribs obtained. COMPARISON: No relevant comparison examinations are currently available.  Clinical information obtained from the electronic medical record indicates right-sided pleuritic chest pain, with a history of trauma on 12/28/2023. FINDINGS: Heart size is normal, as is the appearance of the pulmonary vascularity.  Lung zones are clear, and are free of significant airspace consolidation or volume loss.  No pleural fluid.  No abnormal mediastinal widening.  No pneumothorax. No evidence of recent rib fracture.  No significant osseous abnormality.   No significant intrathoracic abnormality. Electronically signed by: Sathish Sanz MD Date: 01/05/2024 Time: 11:23     Plan:       Rib contusion, right, initial encounter  -     ibuprofen (ADVIL,MOTRIN) 800 MG tablet; Take 1 tablet (800 mg total) by mouth 3 (three) times daily.  Dispense: 21 tablet; Refill: 0  -     methocarbamoL (ROBAXIN) 750 MG Tab; Take 1 tablet (750 mg total) by mouth 3 (three) times daily.  Dispense: 15 tablet; Refill: 0    Fall, initial encounter  -     XR RIB RIGHT W/ PA CHEST; Future; Expected date: 01/05/2024          Medical Decision Making:   History:   Old Medical Records: I decided to obtain old medical records.  Independently Interpreted Test(s):   I have ordered and independently interpreted X-rays - see summary below.  Clinical Tests:   Radiological Study: Ordered and Reviewed  Urgent Care Management:  A. Problem List:   -Acute: Right rib contusion   -Chronic: None  B. Differential diagnosis: fracture, dislocation, soft tissue injury, muscle strain, PE  C. Diagnostic Testing Ordered: Plain films  D. Diagnostic Testing Considered: None  E. Independent Historians: None  F. Urgent Care Midlevel Independent Results Interpretation: Plain films reviewed by me and negative for acute fracture on my wet read.  G. Radiology: See radiology report above-no fracture  H. Review of Previous Medical Records:  I.  Home Medications Reviewed  J. Social Determinants of Health considered  K. Medical Decision Making and Disposition: Patient presents with complaints of pain in the right lateral and lower ribs after a fall over a week ago. On exam, she is afebrile and nontoxic appearing. There is TTP over the right lower chest wall. No ecchymosis, step offs or crepitus. There is no tenderness to the abdominal region. Plain films negative for acute rib fracture. She was prescribed NSAIDs and Robaxin and encouraged close follow-up with PCP. ED precautions discussed, she verbalized understanding and agreed with plan.          Patient Instructions   You must understand that you've received an Urgent Care treatment only and that you may be released before all your medical problems are known or treated. You, the patient, will arrange for follow up care as instructed.      Follow up with your PCP or specialty clinic as instructed in the next 2-3 days if not improved or as needed. You can call [phone] to schedule an appointment with appropriate provider.      If you condition worsens, we recommend that you receive another evaluation at the emergency room immediately or contact your primary medical clinic's after hours call service to discuss your concerns.      Please return here or go to the Emergency Department for any concerns or worsening condition.      If you were prescribed a narcotic or controlled substance, do not drive or operate heavy equipment or machinery while taking these medications.

## 2024-01-23 ENCOUNTER — PATIENT MESSAGE (OUTPATIENT)
Dept: ADMINISTRATIVE | Facility: HOSPITAL | Age: 24
End: 2024-01-23
Payer: COMMERCIAL

## 2024-01-23 ENCOUNTER — PATIENT OUTREACH (OUTPATIENT)
Dept: ADMINISTRATIVE | Facility: HOSPITAL | Age: 24
End: 2024-01-23
Payer: COMMERCIAL

## 2024-01-23 NOTE — PROGRESS NOTES
Updates were requested from care everywhere.  Health Maintenance has been updated.  [facility] immunization registry triggered.  Immunizations were reconciled.  Telephone outreach to updated PCP. LM, portal message sent.

## 2024-01-29 ENCOUNTER — OFFICE VISIT (OUTPATIENT)
Dept: PSYCHIATRY | Facility: CLINIC | Age: 24
End: 2024-01-29
Payer: COMMERCIAL

## 2024-01-29 DIAGNOSIS — F41.1 GAD (GENERALIZED ANXIETY DISORDER): ICD-10-CM

## 2024-01-29 DIAGNOSIS — F32.A DEPRESSION, UNSPECIFIED DEPRESSION TYPE: ICD-10-CM

## 2024-01-29 DIAGNOSIS — R41.840 INATTENTION: ICD-10-CM

## 2024-01-29 PROCEDURE — 99214 OFFICE O/P EST MOD 30 MIN: CPT | Mod: 95,,, | Performed by: STUDENT IN AN ORGANIZED HEALTH CARE EDUCATION/TRAINING PROGRAM

## 2024-01-29 RX ORDER — BUPROPION HYDROCHLORIDE 150 MG/1
150 TABLET ORAL DAILY
Qty: 30 TABLET | Refills: 2 | Status: SHIPPED | OUTPATIENT
Start: 2024-01-29 | End: 2024-05-07 | Stop reason: SDUPTHER

## 2024-01-29 NOTE — PROGRESS NOTES
OUTPATIENT PSYCHIATRY FOLLOW UP VISIT    ENCOUNTER DATE:  1/29/2024  SITE:  [facility]  LENGTH OF SESSION:  30 minutes    TELE PSYCHIATRY Disclaimer   *The patient was informed despite using HIPPA compliant technology there may be risks including security breach, technological failure, inability to perform a comprehensive physical exam which could delay or prevent an accurate diagnosis, and potential complications from treatment decisions rendered over a telemedical platform. The patient understands and consented to the use of tele-health service as being a safe measure to mitigate during COVID 19 Pandemic .  The patient was also informed of the relationship between the physician and patient and the respective role of any other health care provider with respect to management of the patient; and notified that the pt may decline to receive medical services by telemedicine and may withdraw from such care at any time.     Patient's Current location: [place], [place]   In Case of Emergency pts next of kin  Name: patient's mother  Phone number:  [phone]  Visit type: Virtual visit with synchronous audio and video        CHIEF COMPLAINT:  Anxiety, inattention       HISTORY OF PRESENTING ILLNESS:  Sallie Del Real is a 23 y.o. female with history of DERREK, depression who presents for follow up appointment.      Plan at last appointment on 11/13/2023:  Discontinue Zoloft due to side effects and patient stopping.  Start Wellbutrin  mg daily for anxiety, inattention, mood due to side effects on SSRI. Counseled patient on monitoring for return of anxiety symptoms.   PMDP reviewed: yes  Lifestyle modifications to reduce symptoms non-pharmacologically (ex: sleep, exercise, diet, etc).  Consider future establishing with psychotherapy  Limit substance use as drugs/alcohol can exacerbate psychiatric symptoms.  Sleep hygiene     RTC 2 months or sooner if needed    Interval history as told by  "Patient -     Patient reported adherence to Wellbutrin. Initially felt it increased anxiety, but now feels anxiety is improved. Denied any side effects or issues currently.   Patient shared feeling more energetic and not apathetic. Described mood as "happy" and denied prolonged periods of depressed mood. Anxiety is overall manageable. She is sleeping well and does not have trouble falling asleep. Reported inattention is improved and she has noticed better focus overall. She feels current dose has been helpful and would like to keep regimen as is. Patient denied SI, HI, AVH. Yasmine screen negative.   Patient reported school is going well and that she just started a new semester.       PSYCHIATRIC REVIEW OF SYSTEMS:    Symptoms of Depression: denied    Symptoms of Anxiety/ panic attacks: anxiety is improved and manageable     Symptoms of Yasmine/Hypomania: denied    Symptoms of psychosis: denied    Sleep: sleeping well, denied issues with falling asleep     Appetite: no changes, stable     Other: improvement in inattention/focus     Alcohol: denied change     Illicit Drugs: denied    Psychosocial stressors: medical school    Risk Parameters:  Patient reports no suicidal ideation  Patient reports no homicidal ideation  Patient reports no self-injurious behavior  Patient reports no violent behavior    PSYCHIATRIC MED REVIEW    Current psych meds  Medication side effects:  denied  Medication compliance:  yes    Previous psych meds trials   Zoloft, Wellbutrin XL    PAST PSYCHIATRIC, MEDICAL, AND SOCIAL HISTORY REVIEWED  The patient's past medical, family and social history have been reviewed and updated as appropriate within the electronic medical record - see encounter notes.    MEDICAL REVIEW OF SYSTEMS:  No issues reported     ALL MEDICATIONS:    Current Outpatient Medications:     buPROPion (WELLBUTRIN XL) 150 MG TB24 tablet, Take 1 tablet (150 mg total) by mouth once daily., Disp: 30 tablet, Rfl: 2    " "etonogestreL-ethinyl estradioL (NUVARING) 0.12-0.015 mg/24 hr vaginal ring, Place 1 each vaginally every 28 days., Disp: 1 each, Rfl: 11    ferrous gluconate (FERGON) 324 MG tablet, Take 1 tablet (324 mg total) by mouth daily with breakfast., Disp: 90 tablet, Rfl: 1    fluticasone propionate (FLONASE) 50 mcg/actuation nasal spray, 1 spray by Each Nostril route once daily., Disp: , Rfl:     ibuprofen (ADVIL,MOTRIN) 800 MG tablet, Take 1 tablet (800 mg total) by mouth 3 (three) times daily., Disp: 21 tablet, Rfl: 0    methocarbamoL (ROBAXIN) 750 MG Tab, Take 1 tablet (750 mg total) by mouth 3 (three) times daily., Disp: 15 tablet, Rfl: 0    sertraline (ZOLOFT) 50 MG tablet, Take 1 tablet (50 mg total) by mouth once daily. (Patient not taking: Reported on 1/5/2024), Disp: 30 tablet, Rfl: 2    ALLERGIES:  Review of patient's allergies indicates:   Allergen Reactions    Shellfish containing products      Other reaction(s): hives       RELEVANT LABS/STUDIES:    Lab Results   Component Value Date    WBC 5.62 07/11/2023    HGB 13.0 11/08/2023    HCT 38.3 07/11/2023    MCV 89 07/11/2023     07/11/2023     BMP  Lab Results   Component Value Date     07/11/2023    K 4.4 07/11/2023     07/11/2023    CO2 22 (L) 07/11/2023    BUN 16 07/11/2023    CREATININE 0.7 07/11/2023    CALCIUM 9.2 07/11/2023    ANIONGAP 9 07/11/2023    ESTGFRAFRICA >60.0 06/16/2022    EGFRNONAA >60.0 06/16/2022     Lab Results   Component Value Date    ALT 15 11/08/2023    AST 19 11/08/2023    ALKPHOS 42 (L) 11/08/2023    BILITOT 0.4 11/08/2023     Lab Results   Component Value Date    TSH 1.866 07/11/2023     No results found for: "LABA1C", "HGBA1C"    VITALS  There were no vitals filed for this visit.    PHYSICAL EXAM  General: well developed, well nourished  Neurologic:   Gait: unable to assess   Psychomotor signs:  No involuntary movements or tremor  AIMS: none    PSYCHIATRIC EXAM:     Mental Status Exam:  Appearance: unremarkable, " "age appropriate, casually dressed  Behavior/Cooperation: normal, cooperative, eye contact normal  Speech: normal rate, normal volume  Language: uses words appropriately; NO aphasia or dysarthria  Mood:  "happy"  Affect: reactive, mood-congruent, appropriate  Thought Process: normal and logical, goal-directed, future-oriented  Thought Content: normal, no suicidality, no homicidality, delusions, or paranoia  Level of Consciousness: Alert and Oriented x3  Memory:  Intact  Attention/concentration: appropriate for age/education.   Fund of Knowledge: intact to conversation  Insight:  Intact - patient has awareness of current condition(s)  Judgment: Intact - behavior adequate for circumstances      IMPRESSION:    Sallie Del Real is a 23 y.o. female with history of DERREK, depression who presents for follow up appointment.    Status/Progress:  Based on the examination today, the patient's problem(s) is/are well controlled.  New problems have not been presented today.     DIAGNOSES:    ICD-10-CM ICD-9-CM   1. DERREK (generalized anxiety disorder)  F41.1 300.02   2. Depression, unspecified depression type  F32.A 311   3. Inattention  R41.840 799.51       PLAN:    Continue Wellbutrin  mg daily for anxiety, inattention, mood   PMDP reviewed: yes  Lifestyle modifications to reduce symptoms non-pharmacologically (ex: sleep, exercise, diet, etc).  Consider future establishing with psychotherapy  Limit substance use as drugs/alcohol can exacerbate psychiatric symptoms.  Sleep hygiene    RTC 3 months or sooner if needed    Discussed with patient verbal informed consent including: risks and benefits of proposed treatment above vs. alternative treatments vs. no treatment, serious and common side effects of these respective treatments, as well as the inherent unpredictability of individual responses to any treatments, contingency plans if adverse reactions occur, precautions in which to me through Epic MyChart portal or call the " clinic, and precautions in which to call 911 and/or go to the emergency room. The patient expresses understanding of the above and displays the capacity to agree with this current plan. All questions were answered.    Staff Psychiatrist: Dr. Boothe     Total of 30 minutes spent today on encounter, including chart review,  review, seeing patient, documenting encounter, ordering medications.      Elena Landrum MD  [facility], PGY-III  [facility]

## 2024-01-30 NOTE — PROGRESS NOTES
I have reviewed the notes, assessments, and/or procedures performed by [facility] Resident Elena Landrum MD  , I concur with her/his documentation of Sallie Del Real.  Date of Service: 1/29/2024

## 2024-02-24 ENCOUNTER — PATIENT MESSAGE (OUTPATIENT)
Dept: OBSTETRICS AND GYNECOLOGY | Facility: CLINIC | Age: 24
End: 2024-02-24
Payer: COMMERCIAL

## 2024-02-24 DIAGNOSIS — N83.209 CYST OF OVARY, UNSPECIFIED LATERALITY: Primary | ICD-10-CM

## 2024-03-04 NOTE — TELEPHONE ENCOUNTER
Hi, Dr. Lara! This may sound odd but yesterday I was an ultrasound model at a nephrology conference, and they happened upon what looks like a cystic mass while doing a bladder scan. They also thought my uterus looked larger than expected considering Im a couple weeks post-menstruation. I was wondering if I should come in to the office to get this checked out or if I should get an official ultrasound? Ive attached a picture for reference if that helps. Please let me know what you think, thanks!!     Dr. Lara, based on this, what would you diagnose the US with?

## 2024-03-15 ENCOUNTER — PATIENT MESSAGE (OUTPATIENT)
Dept: GASTROENTEROLOGY | Facility: CLINIC | Age: 24
End: 2024-03-15
Payer: COMMERCIAL

## 2024-03-22 ENCOUNTER — TELEPHONE (OUTPATIENT)
Dept: ENDOSCOPY | Facility: HOSPITAL | Age: 24
End: 2024-03-22
Payer: COMMERCIAL

## 2024-03-22 VITALS — BODY MASS INDEX: 24.55 KG/M2 | WEIGHT: 130 LBS | HEIGHT: 61 IN

## 2024-03-22 DIAGNOSIS — R10.13 DYSPEPSIA: ICD-10-CM

## 2024-03-22 DIAGNOSIS — R14.2 BELCHING: ICD-10-CM

## 2024-03-22 DIAGNOSIS — R19.7 DIARRHEA IN ADULT PATIENT: ICD-10-CM

## 2024-03-22 DIAGNOSIS — Z12.11 SPECIAL SCREENING FOR MALIGNANT NEOPLASMS, COLON: Primary | ICD-10-CM

## 2024-03-22 DIAGNOSIS — K21.9 GASTROESOPHAGEAL REFLUX DISEASE, UNSPECIFIED WHETHER ESOPHAGITIS PRESENT: ICD-10-CM

## 2024-03-22 NOTE — TELEPHONE ENCOUNTER
Contacted the patient to schedule an endoscopy procedure(s) 3/21/24. The patient did not answer the call and left a voice message requesting a call back.

## 2024-03-22 NOTE — TELEPHONE ENCOUNTER
Spoke to patient to schedule procedure(s) Colonoscopy       Physician to perform procedure(s) Dr. DONITA Hanson  Date of Procedure (s) 6/21/24  Arrival Time 1:45 PM  Time of Procedure(s) 2:45 PM   Location of Procedure(s) [place] 2nd Floor  Type of Rx Prep sent to patient: PEG  Instructions provided to patient via [facility]    Patient was informed on the following information and verbalized understanding. Screening questionnaire reviewed with patient and complete. If procedure requires anesthesia, a responsible adult needs to be present to accompany the patient home, patient cannot drive after receiving anesthesia. Appointment details are tentative, especially check-in time. Patient will receive a prep-op call 7 days prior to confirm check-in time for procedure. If applicable the patient should contact their pharmacy to verify Rx for procedure prep is ready for pick-up. Patient was advised to call the scheduling department at [phone] if pharmacy states no Rx is available. Patient was advised to call the endoscopy scheduling department if any questions or concerns arise.      [facility] Scheduling Department

## 2024-03-22 NOTE — TELEPHONE ENCOUNTER
"----- Message from Geovanna Arreguin sent at 3/18/2024  8:37 AM CDT -----    ----- Message -----  From: Jose Hanson MD  Sent: 3/15/2024   3:32 PM CDT  To: [facility]    Procedure: EGD/Colonoscopy    Diagnosis:   1. Diarrhea in adult patient   2. Belching   3. Gastroesophageal reflux disease, unspecified whether esophagitis present   4. Dyspepsia         Procedure Timin-12 weeks    #If within 4 weeks selected, please angelina as high priority#    #If greater than 12 weeks, please select "5-12 weeks" and delay sending until 3 months prior to requested date#     Provider: Myself    Location: No Preference    Additional Scheduling Information: No scheduling concerns    Prep Specifications:Standard prep    Is the patient taking a GLP-1 Agonist:no    Have you attached a patient to this message: yes       "

## 2024-03-22 NOTE — TELEPHONE ENCOUNTER
"----- Message from Geovanna Arreguin sent at 3/18/2024  8:37 AM CDT -----     ----- Message -----  From: Jose Hanson MD  Sent: 3/15/2024   3:32 PM CDT  To: [facility] Patients     Procedure: EGD/Colonoscopy     Diagnosis:   1.         Diarrhea in adult patient   2.         Belching   3.         Gastroesophageal reflux disease, unspecified whether esophagitis present   4.         Dyspepsia            Procedure Timin-12 weeks     #If within 4 weeks selected, please angelina as high priority#     #If greater than 12 weeks, please select "5-12 weeks" and delay sending until 3 months prior to requested date#      Provider: Myself     Location: No Preference     Additional Scheduling Information: No scheduling concerns     Prep Specifications:Standard prep     Is the patient taking a GLP-1 Agonist:no     Have you attached a patient to this message: yes            "

## 2024-03-25 ENCOUNTER — OFFICE VISIT (OUTPATIENT)
Dept: OBSTETRICS AND GYNECOLOGY | Facility: CLINIC | Age: 24
End: 2024-03-25
Attending: STUDENT IN AN ORGANIZED HEALTH CARE EDUCATION/TRAINING PROGRAM
Payer: COMMERCIAL

## 2024-03-25 VITALS — DIASTOLIC BLOOD PRESSURE: 72 MMHG | SYSTOLIC BLOOD PRESSURE: 118 MMHG | BODY MASS INDEX: 24.56 KG/M2 | HEIGHT: 61 IN

## 2024-03-25 DIAGNOSIS — N83.209 CYST OF OVARY, UNSPECIFIED LATERALITY: Primary | ICD-10-CM

## 2024-03-25 PROCEDURE — 99213 OFFICE O/P EST LOW 20 MIN: CPT | Mod: S$GLB,,, | Performed by: STUDENT IN AN ORGANIZED HEALTH CARE EDUCATION/TRAINING PROGRAM

## 2024-03-25 PROCEDURE — 3074F SYST BP LT 130 MM HG: CPT | Mod: CPTII,S$GLB,, | Performed by: STUDENT IN AN ORGANIZED HEALTH CARE EDUCATION/TRAINING PROGRAM

## 2024-03-25 PROCEDURE — 3008F BODY MASS INDEX DOCD: CPT | Mod: CPTII,S$GLB,, | Performed by: STUDENT IN AN ORGANIZED HEALTH CARE EDUCATION/TRAINING PROGRAM

## 2024-03-25 PROCEDURE — 3078F DIAST BP <80 MM HG: CPT | Mod: CPTII,S$GLB,, | Performed by: STUDENT IN AN ORGANIZED HEALTH CARE EDUCATION/TRAINING PROGRAM

## 2024-03-25 PROCEDURE — 1159F MED LIST DOCD IN RCRD: CPT | Mod: CPTII,S$GLB,, | Performed by: STUDENT IN AN ORGANIZED HEALTH CARE EDUCATION/TRAINING PROGRAM

## 2024-03-25 PROCEDURE — 99999 PR PBB SHADOW E&M-EST. PATIENT-LVL III: CPT | Mod: PBBFAC,,, | Performed by: STUDENT IN AN ORGANIZED HEALTH CARE EDUCATION/TRAINING PROGRAM

## 2024-03-25 NOTE — PROGRESS NOTES
Chief Complaint: U/S Results, ovarian cyst     HPI:      Sallie Del Real is a 23 y.o.  who presents today for follow up of U/S results.  LMP: Patient's last menstrual period was 2024.  No pain. Doing well on nuva ring.  Cycles are regular and not painful.  No other issues, problems, or complaints.     OB History          0    Para   0    Term   0       0    AB   0    Living   0         SAB   0    IAB   0    Ectopic   0    Multiple   0    Live Births   0               History reviewed. No pertinent past medical history.  Past Surgical History:   Procedure Laterality Date    NASAL TURBINATE REDUCTION  2019,    TONSILLECTOMY      WISDOM TOOTH EXTRACTION       Social History     Socioeconomic History    Marital status: Single   Tobacco Use    Smoking status: Never    Smokeless tobacco: Never   Substance and Sexual Activity    Alcohol use: Yes     Alcohol/week: 2.0 standard drinks of alcohol     Types: 2 Standard drinks or equivalent per week     Comment: socially    Drug use: Never    Sexual activity: Never     Social Determinants of Health     Financial Resource Strain: Low Risk  (2024)    Overall Financial Resource Strain (CARDIA)     Difficulty of Paying Living Expenses: Not hard at all   Food Insecurity: No Food Insecurity (2024)    Hunger Vital Sign     Worried About Running Out of Food in the Last Year: Never true     Ran Out of Food in the Last Year: Never true   Transportation Needs: No Transportation Needs (2024)    PRAPARE - Transportation     Lack of Transportation (Medical): No     Lack of Transportation (Non-Medical): No   Physical Activity: Insufficiently Active (2024)    Exercise Vital Sign     Days of Exercise per Week: 4 days     Minutes of Exercise per Session: 30 min   Stress: No Stress Concern Present (2024)    [facility] - Occupational Stress Questionnaire     Feeling of Stress : Not at all   Social  Connections: Unknown (1/8/2024)    Social Connection and Isolation Panel [NHANES]     Frequency of Communication with Friends and Family: Three times a week     Frequency of Social Gatherings with Friends and Family: Twice a week     Active Member of Clubs or Organizations: Yes     Attends Club or Organization Meetings: More than 4 times per year     Marital Status: Never    Housing Stability: Low Risk  (1/8/2024)    Housing Stability Vital Sign     Unable to Pay for Housing in the Last Year: No     Number of Places Lived in the Last Year: 2     Unstable Housing in the Last Year: No     Family History   Problem Relation Age of Onset    Nephrolithiasis Mother     Hyperlipidemia Father     Nephrolithiasis Sister     Ovarian cysts Sister     No Known Problems Brother     Stroke Maternal Grandmother     Hypertension Maternal Grandfather     Cancer Maternal Grandfather         Colon    Diabetes Maternal Grandfather     Colon cancer Maternal Grandfather     Heart disease Maternal Grandfather     No Known Problems Paternal Grandmother     Breast cancer Neg Hx     Ovarian cancer Neg Hx        Current Outpatient Medications:     buPROPion (WELLBUTRIN XL) 150 MG TB24 tablet, Take 1 tablet (150 mg total) by mouth once daily., Disp: 30 tablet, Rfl: 2    etonogestreL-ethinyl estradioL (NUVARING) 0.12-0.015 mg/24 hr vaginal ring, Place 1 each vaginally every 28 days., Disp: 1 each, Rfl: 11    ferrous gluconate (FERGON) 324 MG tablet, Take 1 tablet (324 mg total) by mouth daily with breakfast., Disp: 90 tablet, Rfl: 1    fluticasone propionate (FLONASE) 50 mcg/actuation nasal spray, 1 spray by Each Nostril route once daily., Disp: , Rfl:     ibuprofen (ADVIL,MOTRIN) 800 MG tablet, Take 1 tablet (800 mg total) by mouth 3 (three) times daily., Disp: 21 tablet, Rfl: 0    methocarbamoL (ROBAXIN) 750 MG Tab, Take 1 tablet (750 mg total) by mouth 3 (three) times daily., Disp: 15 tablet, Rfl: 0    ROS:     GENERAL: Denies  "unintentional weight gain or weight loss. Feeling well overall.   SKIN: Denies rash or lesions.   HEENT: Denies headaches, or vision changes.   ABDOMEN: Denies abdominal pain, constipation, diarrhea, nausea, vomiting, change in appetite.  URINARY: Denies frequency, dysuria, hematuria.  NEUROLOGIC: Denies syncope or weakness.   PSYCHIATRIC: Denies depression, anxiety or mood swings.    Physical Exam:      PHYSICAL EXAM:  /72   Ht 5' 1" (1.549 m)   LMP 2024   BMI 24.56 kg/m²   Body mass index is 24.56 kg/m².     APPEARANCE: Well nourished, well developed, in no acute distress.  PSYCH: Appropriate mood and affect.  SKIN: No acne or hirsutism.      Assessment/Plan:     23 y.o.     Cyst of ovary, unspecified laterality          Counselin.  Ovarian cyst:  U/S results reviewed with patient.  No symptoms.  Small follicle present on L side.  All normal findings.  Reassurance offered.  She will call if symptoms develop or with any other issues or concerns.  Continue nuva ring.  All questions answered.  2.  Follow up with PCP for other health maintenance.  3.  RTC for annual exam or sooner if needed.    Use of the [employer] Patient Portal discussed and encouraged during today's visit.             "

## 2024-05-07 DIAGNOSIS — R41.840 INATTENTION: ICD-10-CM

## 2024-05-07 DIAGNOSIS — F41.1 GAD (GENERALIZED ANXIETY DISORDER): ICD-10-CM

## 2024-05-07 DIAGNOSIS — F32.A DEPRESSION, UNSPECIFIED DEPRESSION TYPE: ICD-10-CM

## 2024-05-10 RX ORDER — BUPROPION HYDROCHLORIDE 150 MG/1
150 TABLET ORAL DAILY
Qty: 30 TABLET | Refills: 2 | Status: SHIPPED | OUTPATIENT
Start: 2024-05-10 | End: 2024-08-08

## 2024-06-10 ENCOUNTER — TELEPHONE (OUTPATIENT)
Dept: ENDOSCOPY | Facility: HOSPITAL | Age: 24
End: 2024-06-10
Payer: COMMERCIAL

## 2024-06-10 ENCOUNTER — TELEPHONE (OUTPATIENT)
Dept: GASTROENTEROLOGY | Facility: CLINIC | Age: 24
End: 2024-06-10
Payer: COMMERCIAL

## 2024-06-10 NOTE — TELEPHONE ENCOUNTER
Contact and spoke with patient and father to cancel procedure due to family emergency. Patient decline to reschedule at this time. Patient egd/colonoscopy procedure canceled.

## 2024-06-10 NOTE — TELEPHONE ENCOUNTER
----- Message from Sammi Wiggins sent at 6/10/2024 12:40 PM CDT -----  Regarding: appt  Contact: [phone]  Pt asking to have appt dated 6/21 cancelled. Pls call to discuss.

## 2024-06-19 ENCOUNTER — PATIENT MESSAGE (OUTPATIENT)
Dept: FAMILY MEDICINE | Facility: CLINIC | Age: 24
End: 2024-06-19
Payer: COMMERCIAL

## 2024-06-19 ENCOUNTER — TELEPHONE (OUTPATIENT)
Dept: FAMILY MEDICINE | Facility: CLINIC | Age: 24
End: 2024-06-19

## 2024-06-19 NOTE — TELEPHONE ENCOUNTER
Tried calling pt but no answer. Left vm for pt stating that provider had an emergency and we have to reschedule appt for today.

## 2024-06-24 ENCOUNTER — PATIENT MESSAGE (OUTPATIENT)
Dept: OBSTETRICS AND GYNECOLOGY | Facility: CLINIC | Age: 24
End: 2024-06-24
Payer: COMMERCIAL

## 2024-06-24 DIAGNOSIS — Z11.1 SCREENING-PULMONARY TB: Primary | ICD-10-CM

## 2024-06-25 RX ORDER — ETONOGESTREL AND ETHINYL ESTRADIOL VAGINAL RING .015; .12 MG/D; MG/D
1 RING VAGINAL
Qty: 3 EACH | Refills: 0 | Status: SHIPPED | OUTPATIENT
Start: 2024-06-25 | End: 2025-06-25

## 2024-06-26 ENCOUNTER — LAB VISIT (OUTPATIENT)
Dept: LAB | Facility: HOSPITAL | Age: 24
End: 2024-06-26
Attending: STUDENT IN AN ORGANIZED HEALTH CARE EDUCATION/TRAINING PROGRAM
Payer: COMMERCIAL

## 2024-06-26 DIAGNOSIS — E61.1 IRON DEFICIENCY: ICD-10-CM

## 2024-06-26 LAB
FERRITIN SERPL-MCNC: 18 NG/ML (ref 20–300)
HGB BLD-MCNC: 13.4 G/DL (ref 12–16)
IRON SERPL-MCNC: 91 UG/DL (ref 30–160)
SATURATED IRON: 18 % (ref 20–50)
TOTAL IRON BINDING CAPACITY: 512 UG/DL (ref 250–450)
TRANSFERRIN SERPL-MCNC: 346 MG/DL (ref 200–375)

## 2024-06-26 PROCEDURE — 83540 ASSAY OF IRON: CPT | Performed by: INTERNAL MEDICINE

## 2024-06-26 PROCEDURE — 85018 HEMOGLOBIN: CPT | Performed by: INTERNAL MEDICINE

## 2024-06-26 PROCEDURE — 36415 COLL VENOUS BLD VENIPUNCTURE: CPT | Mod: PO | Performed by: INTERNAL MEDICINE

## 2024-06-26 PROCEDURE — 82728 ASSAY OF FERRITIN: CPT | Performed by: INTERNAL MEDICINE

## 2024-06-27 ENCOUNTER — LAB VISIT (OUTPATIENT)
Dept: LAB | Facility: HOSPITAL | Age: 24
End: 2024-06-27
Attending: NURSE PRACTITIONER
Payer: COMMERCIAL

## 2024-06-27 DIAGNOSIS — Z11.1 SCREENING-PULMONARY TB: ICD-10-CM

## 2024-06-27 PROCEDURE — 86480 TB TEST CELL IMMUN MEASURE: CPT | Performed by: STUDENT IN AN ORGANIZED HEALTH CARE EDUCATION/TRAINING PROGRAM

## 2024-06-29 LAB
GAMMA INTERFERON BACKGROUND BLD IA-ACNC: 0 IU/ML
M TB IFN-G CD4+ BCKGRND COR BLD-ACNC: -0 IU/ML
M TB IFN-G CD4+ BCKGRND COR BLD-ACNC: 0.01 IU/ML
MITOGEN IGNF BCKGRD COR BLD-ACNC: 10 IU/ML
TB GOLD PLUS: NEGATIVE

## 2024-06-30 ENCOUNTER — PATIENT MESSAGE (OUTPATIENT)
Dept: GASTROENTEROLOGY | Facility: CLINIC | Age: 24
End: 2024-06-30
Payer: COMMERCIAL

## 2024-06-30 NOTE — PROGRESS NOTES
GI MA team - please tell patient that they are iron deficient but not anemic and recommend that they take ferrous gluconate one 324mg pill once daily for next 3 months.    GI MA team -  Please order repeat fasting Hemoglobin, Iron/TIBC, and Ferritin in 8 weeks - Orders placed.     Recommend EGD colonoscopy for further evaluation of GERD dyspepsia belching and diarrhea referral placed

## 2024-07-01 ENCOUNTER — TELEPHONE (OUTPATIENT)
Dept: ENDOSCOPY | Facility: HOSPITAL | Age: 24
End: 2024-07-01
Payer: COMMERCIAL

## 2024-07-01 NOTE — TELEPHONE ENCOUNTER
"----- Message from Geovanna Arreguin sent at 2024  8:56 AM CDT -----    ----- Message -----  From: Jose Hanson MD  Sent: 2024  10:27 AM CDT  To: [facility]    Procedure: EGD/Colonoscopy    Diagnosis: GERD, dyspepsia, belching, and diarrhea   Rule out lactase deficiency, rule out celiac sprue rule out H pylori rule out inflammatory bowel disease or microscopic colitis    Procedure Timin-12 weeks    #If within 4 weeks selected, please angelina as high priority#    #If greater than 12 weeks, please select "5-12 weeks" and delay sending until 3 months prior to requested date#     Location: Any Site    Additional Scheduling Information: Rule out lactase deficiency, rule out celiac sprue rule out H pylori rule out inflammatory bowel disease or microscopic colitis    Prep Specifications:Standard prep    Is the patient taking a GLP-1 Agonist:no    Have you attached a patient to this message: yes  "

## 2024-07-16 ENCOUNTER — TELEPHONE (OUTPATIENT)
Dept: ENDOSCOPY | Facility: HOSPITAL | Age: 24
End: 2024-07-16
Payer: COMMERCIAL

## 2024-07-16 NOTE — TELEPHONE ENCOUNTER
Dr. Hanson ,  I spoke with the pt to schedule procedure , Pt declined to schedule she states she will hold off and that her insurance company might not cover it

## 2024-07-16 NOTE — TELEPHONE ENCOUNTER
"----- Message from Geovanna Arreguin sent at 2024  8:56 AM CDT -----    ----- Message -----  From: Jose Hanson MD  Sent: 2024  10:27 AM CDT  To: [facility]    Procedure: EGD/Colonoscopy    Diagnosis: GERD, dyspepsia, belching, and diarrhea   Rule out lactase deficiency, rule out celiac sprue rule out H pylori rule out inflammatory bowel disease or microscopic colitis    Procedure Timin-12 weeks    #If within 4 weeks selected, please angelina as high priority#    #If greater than 12 weeks, please select "5-12 weeks" and delay sending until 3 months prior to requested date#     Location: Any Site    Additional Scheduling Information: Rule out lactase deficiency, rule out celiac sprue rule out H pylori rule out inflammatory bowel disease or microscopic colitis    Prep Specifications:Standard prep    Is the patient taking a GLP-1 Agonist:no    Have you attached a patient to this message: yes  "

## 2024-09-04 RX ORDER — ETONOGESTREL AND ETHINYL ESTRADIOL .12; .015 MG/D; MG/D
1 INSERT, EXTENDED RELEASE VAGINAL
Qty: 3 EACH | Refills: 1 | Status: SHIPPED | OUTPATIENT
Start: 2024-09-04

## 2024-09-05 NOTE — TELEPHONE ENCOUNTER
Refill Decision Note   Sallie Haydenignac  is requesting a refill authorization.  Brief Assessment and Rationale for Refill:  Approve     Medication Therapy Plan:         Comments:     Note composed:7:16 PM 09/04/2024

## 2024-10-08 ENCOUNTER — PATIENT MESSAGE (OUTPATIENT)
Dept: OBSTETRICS AND GYNECOLOGY | Facility: CLINIC | Age: 24
End: 2024-10-08
Payer: COMMERCIAL

## 2024-10-08 DIAGNOSIS — R41.840 INATTENTION: ICD-10-CM

## 2024-10-08 DIAGNOSIS — F41.1 GAD (GENERALIZED ANXIETY DISORDER): ICD-10-CM

## 2024-10-08 DIAGNOSIS — F32.A DEPRESSION, UNSPECIFIED DEPRESSION TYPE: ICD-10-CM

## 2024-10-08 RX ORDER — BUPROPION HYDROCHLORIDE 150 MG/1
150 TABLET ORAL DAILY
Qty: 90 TABLET | Refills: 0 | Status: SHIPPED | OUTPATIENT
Start: 2024-10-08 | End: 2025-01-06

## 2024-10-18 NOTE — PROGRESS NOTES
"OBSTETRICS AND GYNECOLOGY    Chief Complaint:  Well Woman Exam     HPI:      Sallie Del Real is a 24 y.o.  who presents today for well woman exam.    LMP: Patient's last menstrual period was 10/18/2024 (exact date). Specifically, patient denies abnormal vaginal bleeding, abnormal discharge/odor, pelvic pain, or dysuria/hematuria. Ms. Del Real is currently sexually active with a single female partner. She is currently using oral contraceptives (estrogen/progesterone). She declines STD screening today. She denies additional issues, problems, or complaints.     Gardasil: Completed   Ms. Del Real confirms that she wears her seatbelt when riding in the car.     OB History          0    Para   0    Term   0       0    AB   0    Living   0         SAB   0    IAB   0    Ectopic   0    Multiple   0    Live Births   0               ROS:     GENERAL: Feeling well overall.   BREASTS: Denies breast skin changes, lumps or nipple discharge.   URINARY: Denies dysuria, hematuria.    Physical Exam:      PHYSICAL EXAM:  /78 (BP Location: Right arm, Patient Position: Sitting)   Pulse 78   Resp 18   Ht 5' 1" (1.549 m)   Wt 64.2 kg (141 lb 8.6 oz)   LMP 10/18/2024 (Exact Date)   BMI 26.74 kg/m²   Body mass index is 26.74 kg/m².     APPEARANCE:  Well nourished, well developed, in no acute distress.  Able to smile appropriately during our encounter. Makes eye contact. Pleasant.  PSYCH: Appropriate mood and affect.  SKIN:   No acne or hirsutism.  CARDIOVASCULAR:  No edema of peripheral extremities. Well perfused throughout.  RESP:  No accessory muscle use to breathe. Speaking comfortably in complete sentences.   BREASTS:  Symmetrical, with no visible skin lesions or scars, no palpable masses. No nipple discharge or peau d'orange bilaterally. No palpable axillary LAD.  ABDOMEN:  Soft. Nonacute.    PELVIC:  Normal external genitalia without lesions. Normal hair distribution. Adequate perineal body, normal " urethral meatus. Vagina moist and well rugated. Without lesions, without discharge. Cervix 3x4cm, nulliparous. Cervix pink, without ectocervical lesions, discharge or tenderness.  No ectropion. No significant cystocele or rectocele.  Bimanual exam shows uterus to be normal size, regular, mobile and nontender.  Adnexa without masses or tenderness.  Ring in place.    Female chaperone present.    Assessment/Plan:     Well Woman Exam  -- Counseled patient regarding healthy diet and regular exercise, daily seat belt use.   -- BP normotensive  -- She denies abuse and feels safe at home.   -- Pap smear:  obtained   NILM 5/2021  -- Contraception:  nuvaring refills  - Denies combination OCP contraindications  - I specifically told her to seek medical attention should she develop shortness of breath, chest pain, severe headache, painful leg swelling as Estrogen administration can increases risk of VTE, patient verbalized understanding.  -- STD screening:  declines   -- Requests wellbutrin refill  Reports mood overall good, stable on current dose  Denies SI/HI  Strict precautions  -- Patient of Dr. Lara     Follow up in about 1 year (around 11/4/2025) for WWE.    Counseling:     Patient was counseled today on current ASCCP pap guidelines, the recommendation for yearly physical exams, safe driving habits, and breast self awareness. She is to see her PCP for other health maintenance.     Use of the [employer] Patient Portal discussed and encouraged during today's visit.                 As of April 1, 2021, the Cures Act has been passed nationally. This new law requires that all doctors progress notes, lab results, pathology reports and radiology reports be released IMMEDIATELY to the patient in the patient portal. That means that the results are released to you at the EXACT same time they are released to me. Therefore, with all of the patients that I have I am not able to reply to each patient exactly when the results come  in. So there will be a delay from when you see the results to when I see them and have time to come up with a response to send you. Also I only see these results when I am on the computer at work. So if the results come in over the weekend or after 5 pm of a work day, I will not see them until the next business day. As you can tell, this is a challenge as a physician to give every patient the quick response they hope for and deserve. So please be patient!   Thanks for your understanding and patience.

## 2024-11-04 ENCOUNTER — OFFICE VISIT (OUTPATIENT)
Dept: OBSTETRICS AND GYNECOLOGY | Facility: CLINIC | Age: 24
End: 2024-11-04
Payer: COMMERCIAL

## 2024-11-04 VITALS
HEART RATE: 78 BPM | RESPIRATION RATE: 18 BRPM | HEIGHT: 61 IN | WEIGHT: 141.56 LBS | DIASTOLIC BLOOD PRESSURE: 78 MMHG | SYSTOLIC BLOOD PRESSURE: 114 MMHG | BODY MASS INDEX: 26.73 KG/M2

## 2024-11-04 DIAGNOSIS — R41.840 INATTENTION: ICD-10-CM

## 2024-11-04 DIAGNOSIS — Z12.4 SCREENING FOR CERVICAL CANCER: ICD-10-CM

## 2024-11-04 DIAGNOSIS — Z01.419 ENCOUNTER FOR WELL WOMAN EXAM: Primary | ICD-10-CM

## 2024-11-04 DIAGNOSIS — F41.1 GAD (GENERALIZED ANXIETY DISORDER): ICD-10-CM

## 2024-11-04 DIAGNOSIS — F32.A DEPRESSION, UNSPECIFIED DEPRESSION TYPE: ICD-10-CM

## 2024-11-04 PROCEDURE — 99395 PREV VISIT EST AGE 18-39: CPT | Mod: S$GLB,,, | Performed by: STUDENT IN AN ORGANIZED HEALTH CARE EDUCATION/TRAINING PROGRAM

## 2024-11-04 PROCEDURE — 88175 CYTOPATH C/V AUTO FLUID REDO: CPT | Performed by: STUDENT IN AN ORGANIZED HEALTH CARE EDUCATION/TRAINING PROGRAM

## 2024-11-04 PROCEDURE — 3074F SYST BP LT 130 MM HG: CPT | Mod: CPTII,S$GLB,, | Performed by: STUDENT IN AN ORGANIZED HEALTH CARE EDUCATION/TRAINING PROGRAM

## 2024-11-04 PROCEDURE — 3008F BODY MASS INDEX DOCD: CPT | Mod: CPTII,S$GLB,, | Performed by: STUDENT IN AN ORGANIZED HEALTH CARE EDUCATION/TRAINING PROGRAM

## 2024-11-04 PROCEDURE — 99999 PR PBB SHADOW E&M-EST. PATIENT-LVL III: CPT | Mod: PBBFAC,,, | Performed by: STUDENT IN AN ORGANIZED HEALTH CARE EDUCATION/TRAINING PROGRAM

## 2024-11-04 PROCEDURE — 1159F MED LIST DOCD IN RCRD: CPT | Mod: CPTII,S$GLB,, | Performed by: STUDENT IN AN ORGANIZED HEALTH CARE EDUCATION/TRAINING PROGRAM

## 2024-11-04 PROCEDURE — 3078F DIAST BP <80 MM HG: CPT | Mod: CPTII,S$GLB,, | Performed by: STUDENT IN AN ORGANIZED HEALTH CARE EDUCATION/TRAINING PROGRAM

## 2024-11-04 RX ORDER — BUPROPION HYDROCHLORIDE 150 MG/1
150 TABLET ORAL DAILY
Qty: 90 TABLET | Refills: 1 | Status: SHIPPED | OUTPATIENT
Start: 2024-11-04 | End: 2025-05-03

## 2024-11-04 RX ORDER — ETONOGESTREL AND ETHINYL ESTRADIOL VAGINAL RING .015; .12 MG/D; MG/D
1 RING VAGINAL
Qty: 3 EACH | Refills: 3 | Status: SHIPPED | OUTPATIENT
Start: 2024-11-04

## 2024-11-08 LAB
FINAL PATHOLOGIC DIAGNOSIS: NORMAL
Lab: NORMAL

## 2025-02-07 ENCOUNTER — OFFICE VISIT (OUTPATIENT)
Dept: FAMILY MEDICINE | Facility: CLINIC | Age: 25
End: 2025-02-07
Payer: MEDICAID

## 2025-02-07 VITALS
DIASTOLIC BLOOD PRESSURE: 70 MMHG | TEMPERATURE: 99 F | HEART RATE: 77 BPM | HEIGHT: 62 IN | OXYGEN SATURATION: 100 % | BODY MASS INDEX: 26.25 KG/M2 | SYSTOLIC BLOOD PRESSURE: 104 MMHG | WEIGHT: 142.63 LBS

## 2025-02-07 DIAGNOSIS — Z02.0 SCHOOL PHYSICAL EXAM: Primary | ICD-10-CM

## 2025-02-07 DIAGNOSIS — F41.1 GENERALIZED ANXIETY DISORDER: ICD-10-CM

## 2025-02-07 DIAGNOSIS — Z23 ENCOUNTER FOR IMMUNIZATION: ICD-10-CM

## 2025-02-07 DIAGNOSIS — Z00.00 ANNUAL PHYSICAL EXAM: ICD-10-CM

## 2025-02-07 DIAGNOSIS — Z76.89 ENCOUNTER TO ESTABLISH CARE WITH NEW DOCTOR: ICD-10-CM

## 2025-02-07 PROCEDURE — 3008F BODY MASS INDEX DOCD: CPT | Mod: CPTII,,, | Performed by: FAMILY MEDICINE

## 2025-02-07 PROCEDURE — 99214 OFFICE O/P EST MOD 30 MIN: CPT | Mod: PBBFAC,PO,25 | Performed by: FAMILY MEDICINE

## 2025-02-07 PROCEDURE — 3078F DIAST BP <80 MM HG: CPT | Mod: CPTII,,, | Performed by: FAMILY MEDICINE

## 2025-02-07 PROCEDURE — 99395 PREV VISIT EST AGE 18-39: CPT | Mod: S$PBB,,, | Performed by: FAMILY MEDICINE

## 2025-02-07 PROCEDURE — 1159F MED LIST DOCD IN RCRD: CPT | Mod: CPTII,,, | Performed by: FAMILY MEDICINE

## 2025-02-07 PROCEDURE — 90480 ADMN SARSCOV2 VAC 1/ONLY CMP: CPT | Mod: PBBFAC,PO

## 2025-02-07 PROCEDURE — 3074F SYST BP LT 130 MM HG: CPT | Mod: CPTII,,, | Performed by: FAMILY MEDICINE

## 2025-02-07 PROCEDURE — 99999 PR PBB SHADOW E&M-EST. PATIENT-LVL IV: CPT | Mod: PBBFAC,,, | Performed by: FAMILY MEDICINE

## 2025-02-07 PROCEDURE — 99999PBSHW PR PBB SHADOW TECHNICAL ONLY FILED TO HB: Mod: PBBFAC,,,

## 2025-02-07 PROCEDURE — 91320 SARSCV2 VAC 30MCG TRS-SUC IM: CPT | Mod: PBBFAC,PO

## 2025-02-07 RX ADMIN — COVID-19 VACCINE, MRNA 0.3 ML: 0.04 INJECTION, SUSPENSION INTRAMUSCULAR at 02:02

## 2025-02-07 NOTE — PROGRESS NOTES
(Portions of this note were dictated using voice recognition software and may contain dictation related errors in spelling/grammar/syntax not found on text review)    CC:   Chief Complaint   Patient presents with    [facility]       HPI: 24 y.o. female presented to [facility] as new pt. She has medical history significant for DERREK and no other significant history on file.    She takes Wellbutrin 150 mg daily, symptoms are stable.    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    She is due for annual labs.    She does not smoke, has no toxic habits.    She is physically active, denies any other symptoms or concerns.    History reviewed. No pertinent past medical history.    Past Surgical History:   Procedure Laterality Date    NASAL TURBINATE REDUCTION  12/2019 2010,2019    TONSILLECTOMY      WISDOM TOOTH EXTRACTION         Family History   Problem Relation Name Age of Onset    Nephrolithiasis Mother      Hyperlipidemia Father      Nephrolithiasis Sister      Ovarian cysts Sister      No Known Problems Brother      Stroke Maternal Grandmother      Hypertension Maternal Grandfather      Cancer Maternal Grandfather          Colon    Diabetes Maternal Grandfather      Colon cancer Maternal Grandfather      Heart disease Maternal Grandfather      No Known Problems Paternal Grandmother      Breast cancer Neg Hx      Ovarian cancer Neg Hx         Social History     Tobacco Use    Smoking status: Never    Smokeless tobacco: Never   Substance Use Topics    Alcohol use: Yes     Alcohol/week: 2.0 standard drinks of alcohol     Types: 2 Standard drinks or equivalent per week     Comment: socially    Drug use: Never       Lab Results   Component Value Date    WBC 5.62 07/11/2023    HGB 13.4 06/26/2024    HCT 38.3 07/11/2023    MCV 89 07/11/2023     07/11/2023    CHOL 186 07/11/2023    TRIG 107 07/11/2023    HDL 54 07/11/2023    ALT 15 11/08/2023    AST  19 11/08/2023    BILITOT 0.4 11/08/2023    ALKPHOS 42 (L) 11/08/2023     07/11/2023    K 4.4 07/11/2023     07/11/2023    CREATININE 0.7 07/11/2023    ESTGFRAFRICA >60.0 06/16/2022    EGFRNONAA >60.0 06/16/2022    CALCIUM 9.2 07/11/2023    ALBUMIN 4.2 11/08/2023    BUN 16 07/11/2023    CO2 22 (L) 07/11/2023    TSH 1.866 07/11/2023    LDLCALC 110.6 07/11/2023    GLU 81 07/11/2023             Vital signs reviewed  PE:   APPEARANCE: Well nourished, well developed, in no acute distress.    HEAD: Normocephalic, atraumatic.  EYES: EOMI.  Conjunctivae noninjected.  NOSE: Mucosa pink. Airway clear.  NECK: Supple with no cervical lymphadenopathy.    CHEST: Good inspiratory effort. Lungs clear to auscultation with no wheezes or crackles.  CARDIOVASCULAR: Normal S1, S2. No rubs, murmurs, or gallops.  ABDOMEN: Bowel sounds normal. Not distended. Soft. No tenderness or masses. No organomegaly.  EXTREMITIES: No edema, cyanosis, or clubbing.    Review of Systems   Constitutional:  Negative for chills, fatigue and fever.   HENT: Negative.     Respiratory:  Negative for cough, shortness of breath and wheezing.    Cardiovascular:  Negative for chest pain, palpitations and leg swelling.   Gastrointestinal: Negative.    Genitourinary: Negative.    Neurological: Negative.    Psychiatric/Behavioral: Negative.     All other systems reviewed and are negative.      IMPRESSION  1. School physical exam    2. Encounter for immunization    3. Annual physical exam    4. Encounter to establish care with new doctor    5. Generalized anxiety disorder    6. BMI 26.0-26.9,adult            PLAN      1. School physical exam (Primary)    - Rubella antibody, IgG; Future  - Rubeola antibody IgG; Future  - Mumps, IgG Screen; Future  - Hepatitis B Surface Antibody, Qual/Quant; Future  - Varicella zoster antibody, IgG; Future  - QUANTIFERON GOLD TB; Future      2. Encounter for immunization    - COVID-19 (Pfizer) 30 mcg/0.3 mL IM vaccine (>/= 12  yo) 0.3 mL      3. Annual physical exam    - CBC Auto Differential; Future  - Comprehensive Metabolic Panel; Future  - Lipid Panel; Future  - TSH; Future  - Iron and TIBC      4. Encounter to establish care with new doctor    - CBC Auto Differential; Future  - Comprehensive Metabolic Panel; Future  - Lipid Panel; Future  - TSH; Future  - Iron and TIBC    5. Generalized anxiety disorder    Stable    Continue current medication      6. BMI 26.0-26.9,adult    Counseling provided on healthy lifestyle, encouraged to do moderate intensity regular exercise 30 minutes every day 5 days a week, to include vegetables and fruits and cut back on saturated fats and carbohydrates.          SCREENINGS      Immunizations:     Covid booster today    UTD with Tdap    UTD with flu vaccine      Age/demographic appropriate health maintenance:    UTD with pap smear      Health Maintenance Due   Topic Date Due    COVID-19 Vaccine (5 - 2024-25 season) 09/01/2024           Spent adequate time in obtaining history and explaining differentials     35 minutes spent during this visit of which greater than 50% devoted to face-face counseling and coordination of care regarding diagnosis and management plan       Chago Rizzo   2/7/2025

## 2025-02-10 ENCOUNTER — LAB VISIT (OUTPATIENT)
Dept: LAB | Facility: HOSPITAL | Age: 25
End: 2025-02-10
Attending: FAMILY MEDICINE
Payer: COMMERCIAL

## 2025-02-10 DIAGNOSIS — Z76.89 ENCOUNTER TO ESTABLISH CARE WITH NEW DOCTOR: ICD-10-CM

## 2025-02-10 DIAGNOSIS — Z02.0 SCHOOL PHYSICAL EXAM: ICD-10-CM

## 2025-02-10 DIAGNOSIS — Z00.00 ANNUAL PHYSICAL EXAM: ICD-10-CM

## 2025-02-10 LAB
ALBUMIN SERPL BCP-MCNC: 4.1 G/DL (ref 3.5–5.2)
ALP SERPL-CCNC: 60 U/L (ref 40–150)
ALT SERPL W/O P-5'-P-CCNC: 45 U/L (ref 10–44)
ANION GAP SERPL CALC-SCNC: 9 MMOL/L (ref 8–16)
AST SERPL-CCNC: 43 U/L (ref 10–40)
BASOPHILS # BLD AUTO: 0.03 K/UL (ref 0–0.2)
BASOPHILS NFR BLD: 0.5 % (ref 0–1.9)
BILIRUB SERPL-MCNC: 0.2 MG/DL (ref 0.1–1)
BUN SERPL-MCNC: 10 MG/DL (ref 6–20)
CALCIUM SERPL-MCNC: 9.7 MG/DL (ref 8.7–10.5)
CHLORIDE SERPL-SCNC: 104 MMOL/L (ref 95–110)
CHOLEST SERPL-MCNC: 198 MG/DL (ref 120–199)
CHOLEST/HDLC SERPL: 2.8 {RATIO} (ref 2–5)
CO2 SERPL-SCNC: 25 MMOL/L (ref 23–29)
CREAT SERPL-MCNC: 0.8 MG/DL (ref 0.5–1.4)
DIFFERENTIAL METHOD BLD: NORMAL
EOSINOPHIL # BLD AUTO: 0.1 K/UL (ref 0–0.5)
EOSINOPHIL NFR BLD: 2.1 % (ref 0–8)
ERYTHROCYTE [DISTWIDTH] IN BLOOD BY AUTOMATED COUNT: 12.3 % (ref 11.5–14.5)
EST. GFR  (NO RACE VARIABLE): >60 ML/MIN/1.73 M^2
GLUCOSE SERPL-MCNC: 93 MG/DL (ref 70–110)
HCT VFR BLD AUTO: 44.7 % (ref 37–48.5)
HDLC SERPL-MCNC: 72 MG/DL (ref 40–75)
HDLC SERPL: 36.4 % (ref 20–50)
HGB BLD-MCNC: 14.3 G/DL (ref 12–16)
IMM GRANULOCYTES # BLD AUTO: 0.02 K/UL (ref 0–0.04)
IMM GRANULOCYTES NFR BLD AUTO: 0.3 % (ref 0–0.5)
IRON SERPL-MCNC: 37 UG/DL (ref 30–160)
LDLC SERPL CALC-MCNC: 109.4 MG/DL (ref 63–159)
LYMPHOCYTES # BLD AUTO: 1.4 K/UL (ref 1–4.8)
LYMPHOCYTES NFR BLD: 24.7 % (ref 18–48)
MCH RBC QN AUTO: 28.7 PG (ref 27–31)
MCHC RBC AUTO-ENTMCNC: 32 G/DL (ref 32–36)
MCV RBC AUTO: 90 FL (ref 82–98)
MONOCYTES # BLD AUTO: 0.4 K/UL (ref 0.3–1)
MONOCYTES NFR BLD: 7.1 % (ref 4–15)
NEUTROPHILS # BLD AUTO: 3.8 K/UL (ref 1.8–7.7)
NEUTROPHILS NFR BLD: 65.3 % (ref 38–73)
NONHDLC SERPL-MCNC: 126 MG/DL
NRBC BLD-RTO: 0 /100 WBC
PLATELET # BLD AUTO: 337 K/UL (ref 150–450)
PMV BLD AUTO: 11.2 FL (ref 9.2–12.9)
POTASSIUM SERPL-SCNC: 4.1 MMOL/L (ref 3.5–5.1)
PROT SERPL-MCNC: 8.1 G/DL (ref 6–8.4)
RBC # BLD AUTO: 4.98 M/UL (ref 4–5.4)
SATURATED IRON: 8 % (ref 20–50)
SODIUM SERPL-SCNC: 138 MMOL/L (ref 136–145)
TOTAL IRON BINDING CAPACITY: 482 UG/DL (ref 250–450)
TRANSFERRIN SERPL-MCNC: 326 MG/DL (ref 200–375)
TRIGL SERPL-MCNC: 83 MG/DL (ref 30–150)
TSH SERPL DL<=0.005 MIU/L-ACNC: 1.52 UIU/ML (ref 0.4–4)
WBC # BLD AUTO: 5.79 K/UL (ref 3.9–12.7)

## 2025-02-10 PROCEDURE — 86706 HEP B SURFACE ANTIBODY: CPT | Performed by: FAMILY MEDICINE

## 2025-02-10 PROCEDURE — 83540 ASSAY OF IRON: CPT | Performed by: FAMILY MEDICINE

## 2025-02-10 PROCEDURE — 86735 MUMPS ANTIBODY: CPT | Performed by: FAMILY MEDICINE

## 2025-02-10 PROCEDURE — 80053 COMPREHEN METABOLIC PANEL: CPT | Performed by: FAMILY MEDICINE

## 2025-02-10 PROCEDURE — 86480 TB TEST CELL IMMUN MEASURE: CPT | Performed by: FAMILY MEDICINE

## 2025-02-10 PROCEDURE — 86762 RUBELLA ANTIBODY: CPT | Performed by: FAMILY MEDICINE

## 2025-02-10 PROCEDURE — 86787 VARICELLA-ZOSTER ANTIBODY: CPT | Performed by: FAMILY MEDICINE

## 2025-02-10 PROCEDURE — 84443 ASSAY THYROID STIM HORMONE: CPT | Performed by: FAMILY MEDICINE

## 2025-02-10 PROCEDURE — 85025 COMPLETE CBC W/AUTO DIFF WBC: CPT | Performed by: FAMILY MEDICINE

## 2025-02-10 PROCEDURE — 86765 RUBEOLA ANTIBODY: CPT | Performed by: FAMILY MEDICINE

## 2025-02-10 PROCEDURE — 80061 LIPID PANEL: CPT | Performed by: FAMILY MEDICINE

## 2025-02-11 LAB
GAMMA INTERFERON BACKGROUND BLD IA-ACNC: 0.01 IU/ML
M TB IFN-G CD4+ BCKGRND COR BLD-ACNC: 0.03 IU/ML
M TB IFN-G CD4+ BCKGRND COR BLD-ACNC: 0.05 IU/ML
MITOGEN IGNF BCKGRD COR BLD-ACNC: 3.34 IU/ML
MUMPS IGG INTERPRETATION: POSITIVE
MUMPS IGG SCREEN: 53.8 AU/ML
RUBEOLA IGG ANTIBODY: 120 AU/ML
RUBEOLA INTERPRETATION: POSITIVE
RUBV IGG SER-ACNC: 35.9 IU/ML
RUBV IGG SER-IMP: REACTIVE
TB GOLD PLUS: NEGATIVE
VARICELLA INTERPRETATION: POSITIVE
VARICELLA ZOSTER IGG: 5.99 S/CO

## 2025-02-13 LAB
HBV SURFACE AB SER QL IA: POSITIVE
HBV SURFACE AB SERPL IA-ACNC: 226 MIU/ML

## 2025-02-14 ENCOUNTER — TELEPHONE (OUTPATIENT)
Dept: FAMILY MEDICINE | Facility: CLINIC | Age: 25
End: 2025-02-14
Payer: COMMERCIAL

## 2025-02-14 ENCOUNTER — PATIENT MESSAGE (OUTPATIENT)
Dept: FAMILY MEDICINE | Facility: CLINIC | Age: 25
End: 2025-02-14
Payer: COMMERCIAL

## 2025-02-18 ENCOUNTER — RESULTS FOLLOW-UP (OUTPATIENT)
Dept: FAMILY MEDICINE | Facility: CLINIC | Age: 25
End: 2025-02-18

## 2025-07-13 DIAGNOSIS — R41.840 INATTENTION: ICD-10-CM

## 2025-07-13 DIAGNOSIS — F32.A DEPRESSION, UNSPECIFIED DEPRESSION TYPE: ICD-10-CM

## 2025-07-13 DIAGNOSIS — F41.1 GAD (GENERALIZED ANXIETY DISORDER): ICD-10-CM

## 2025-07-14 RX ORDER — BUPROPION HYDROCHLORIDE 150 MG/1
150 TABLET ORAL DAILY
Qty: 90 TABLET | Refills: 1 | Status: SHIPPED | OUTPATIENT
Start: 2025-07-14

## 2025-07-14 NOTE — TELEPHONE ENCOUNTER
Refill Decision Note   Sallie Haydenignac  is requesting a refill authorization.  Brief Assessment and Rationale for Refill:  Approve     Medication Therapy Plan:        Comments:     Note composed:9:35 AM 07/14/2025

## 2025-08-22 ENCOUNTER — OFFICE VISIT (OUTPATIENT)
Dept: FAMILY MEDICINE | Facility: CLINIC | Age: 25
End: 2025-08-22
Payer: MEDICAID

## 2025-08-22 ENCOUNTER — TELEPHONE (OUTPATIENT)
Dept: FAMILY MEDICINE | Facility: CLINIC | Age: 25
End: 2025-08-22

## 2025-08-22 DIAGNOSIS — L98.9 SKIN LESION: ICD-10-CM

## 2025-08-22 DIAGNOSIS — F41.1 GENERALIZED ANXIETY DISORDER: Primary | ICD-10-CM

## 2025-08-22 RX ORDER — ESCITALOPRAM OXALATE 10 MG/1
10 TABLET ORAL DAILY
Qty: 30 TABLET | Refills: 3 | Status: SHIPPED | OUTPATIENT
Start: 2025-08-22 | End: 2026-08-22